# Patient Record
Sex: MALE | Race: AMERICAN INDIAN OR ALASKA NATIVE
[De-identification: names, ages, dates, MRNs, and addresses within clinical notes are randomized per-mention and may not be internally consistent; named-entity substitution may affect disease eponyms.]

---

## 2017-08-06 ENCOUNTER — HOSPITAL ENCOUNTER (EMERGENCY)
Dept: HOSPITAL 5 - ED | Age: 37
Discharge: HOME | End: 2017-08-06
Payer: SELF-PAY

## 2017-08-06 VITALS — DIASTOLIC BLOOD PRESSURE: 66 MMHG | SYSTOLIC BLOOD PRESSURE: 116 MMHG

## 2017-08-06 DIAGNOSIS — F17.200: ICD-10-CM

## 2017-08-06 DIAGNOSIS — M10.9: Primary | ICD-10-CM

## 2017-08-06 PROCEDURE — 73562 X-RAY EXAM OF KNEE 3: CPT

## 2017-08-06 PROCEDURE — 99283 EMERGENCY DEPT VISIT LOW MDM: CPT

## 2017-08-06 PROCEDURE — 96372 THER/PROPH/DIAG INJ SC/IM: CPT

## 2017-08-06 NOTE — XRAY REPORT
FINAL REPORT



PROCEDURE:  XR KNEE 3V RT



TECHNIQUE:  Three views of the right knee are obtained



HISTORY:  Right knee pain and swelling/ GOUT 



COMPARISON:  No prior studies are available for comparison.



FINDINGS:  

Mild soft tissue swelling and joint effusion are seen. No

erosions or soft tissue calcifications are seen. No fracture or

dislocation is seen.



IMPRESSION:  

Mild soft tissue swelling and joint effusion are seen.

## 2017-08-06 NOTE — EMERGENCY DEPARTMENT REPORT
Entered by DI GORDON, acting as scribe for JOHNNIE SILVER NP.





ED Lower Extremity HPI





- General


Chief Complaint: Extremity Injury, Lower


Stated Complaint: RT KNEE SWOLLEN GOUT


Time Seen by Provider: 17 17:01


Source: patient


Mode of arrival: Ambulatory


Limitations: No Limitations





- History of Present Illness


Initial Comments: 





This is a 36 y/o male, nontoxic, well nourished in appearance, no acute signs 

of distress presents with right knee pain x 2 days. Patient states he has a hx 

of gout.  Patient denies any trauma. Patient stated has similar symptoms in the 

past for gout and has been treated with steroids and pain medication.  Patients 

associated symptoms include swelling and pain but he denies numbness, tingling, 

fever, chills, joint redness, chest pain, shortness of breathe, nausea and 

vomiting. Pain is described as 8/10.  Denies injury. No alleviating factors 

despite taking OTC meds and no aggravating factors. FRANCES FINNEY Complaint: other (right knee pain)


Onset/Timin


-: days(s)


Injury: Knee: Right


Place: home


Severity: moderate


Severity scale (0 -10): 8


Improves With: nothing


Worsens With: nothing


Context: other (PMHx of gout)


Associated Symptoms: swelling, ambulatory.  denies: snap/pop sensation, numbness

, tingling, unable to bear weight, able to partially bear weight, other (fever, 

chills, nausea, vomiting)





- Related Data


 Previous Rx's











 Medication  Instructions  Recorded  Last Taken  Type


 


Ibuprofen [Motrin 600 MG tab] 600 mg PO Q8H PRN #30 tablet 17 Unknown Rx


 


predniSONE [Deltasone] 20 mg PO BID #10 tab 17 Unknown Rx











 Allergies











Allergy/AdvReac Type Severity Reaction Status Date / Time


 


No Known Allergies Allergy   Unverified 07/30/15 11:36














ED Review of Systems


Comment: All other systems reviewed and negative


Constitutional: denies: chills, fever


Eyes: denies: eye pain, eye discharge, vision change


ENT: denies: ear pain, throat pain


Respiratory: denies: cough, shortness of breath, wheezing


Cardiovascular: denies: chest pain, palpitations


Endocrine: no symptoms reported


Gastrointestinal: denies: nausea, vomiting


Genitourinary: denies: urgency, dysuria


Musculoskeletal: denies: back pain, joint swelling, arthralgia


Skin: other (swelling)


Neurological: denies: numbness, other (tingling)


Psychiatric: denies: anxiety, depression


Hematological/Lymphatic: denies: easy bleeding, easy bruising





ED Past Medical Hx





- Past Medical History


Previous Medical History?: Yes


Additional medical history: gout





- Surgical History


Past Surgical History?: No





- Social History


Smoking Status: Current Every Day Smoker


Substance Use Type: Alcohol, Non Opiate Pain





- Medications


Home Medications: 


 Home Medications











 Medication  Instructions  Recorded  Confirmed  Last Taken  Type


 


Ibuprofen [Motrin 600 MG tab] 600 mg PO Q8H PRN #30 tablet 17  Unknown Rx


 


predniSONE [Deltasone] 20 mg PO BID #10 tab 17  Unknown Rx














ED Physical Exam





- General


Limitations: No Limitations


General appearance: alert, in no apparent distress





- Head


Head exam: Present: atraumatic, normocephalic, normal inspection





- Eye


Eye exam: Present: normal appearance, PERRL, EOMI.  Absent: scleral icterus, 

conjunctival injection, nystagmus, periorbital swelling, periorbital tenderness


Pupils: Present: normal accommodation





- ENT


ENT exam: Present: normal exam, normal orophraynx, mucous membranes moist, TM's 

normal bilaterally, normal external ear exam





- Neck


Neck exam: Present: normal inspection, full ROM.  Absent: tenderness, 

meningismus, lymphadenopathy, thyromegaly





- Respiratory


Respiratory exam: Present: normal lung sounds bilaterally.  Absent: respiratory 

distress, wheezes, rales, rhonchi, stridor, chest wall tenderness, accessory 

muscle use, decreased breath sounds, prolonged expiratory





- Cardiovascular


Cardiovascular Exam: Present: regular rate, normal rhythm, normal heart sounds.

  Absent: bradycardia, tachycardia, irregular rhythm, systolic murmur, 

diastolic murmur, rubs, gallop





- GI/Abdominal


GI/Abdominal exam: Present: soft, normal bowel sounds.  Absent: distended, 

tenderness, guarding, rebound, rigid, diminished bowel sounds





- Rectal


Rectal exam: Present: deferred





- Extremities Exam


Extremities exam: Present: normal inspection, full ROM, normal capillary 

refill.  Absent: tenderness, pedal edema, joint swelling, calf tenderness





- Expanded Lower Extremity Exam


  ** Right


Hip exam: Present: normal inspection, full ROM.  Absent: tenderness


Upper Leg exam: Present: normal inspection, full ROM.  Absent: tenderness


Knee exam: Present: normal inspection (negative warm to touch.), full ROM, 

tenderness, swelling, effusion, full knee extension.  Absent: abrasion, 

laceration, ecchymosis, deformity, crepidus, dislocation, erythema, pain w/ 

pronation/supination, posterior draw sign, pain/laxity with valgus, pain/laxity 

with varus


Lower Leg exam: Present: normal inspection, full ROM.  Absent: tenderness, 

swelling, abrasion, laceration, ecchymosis, deformity, crepidus, dislocation, 

erythema, palpable cord, Marisel's sign


Ankle exam: Present: normal inspection, full ROM.  Absent: tenderness, swelling

, abrasion, laceration, ecchymosis, deformity, crepidus, dislocation, erythema, 

anterior draw sign


Foot/Toe exam: Present: normal inspection, full ROM.  Absent: tenderness, 

swelling, abrasion, laceration, ecchymosis, deformity, crepidus, dislocation, 

erythema, amputation, puncture wound, foreign body, calcaneal tenderness, 

tenderness at base of 5th metatarsal, nail avulsion, subungual hematoma


Neuro vascular tendon exam: Present: no vascular compromise.  Absent: pulse 

deficit, abnormal cap refill, motor deficit, sensory deficit, tendon deficit, 

extremity cold to touch, pallor, abnormal 2-point discrimination, decreased fine

/light touch, foot drop, peroneal nerve deficit, significant pain with passive 

ROM of distal joint


Gait: Positive: observed and normal





- Back Exam


Back exam: Present: normal inspection, full ROM.  Absent: tenderness, CVA 

tenderness (R), CVA tenderness (L), muscle spasm, paraspinal tenderness, 

vertebral tenderness, rash noted





- Neurological Exam


Neurological exam: Present: alert, oriented X3, CN II-XII intact, normal gait, 

reflexes normal





- Psychiatric


Psychiatric exam: Present: normal affect, normal mood





- Skin


Skin exam: Present: warm, dry, intact, normal color.  Absent: rash, erythema, 

other (cellulitis)





ED Course


 Vital Signs











  17





  15:46


 


Temperature 98.1 F


 


Pulse Rate 97 H


 


Respiratory 18





Rate 


 


Blood Pressure 116/66


 


O2 Sat by Pulse 95





Oximetry 














- Reevaluation(s)


Reevaluation #1: 





17 17:32


Patient is able to speak in full sentences with no signs of distress noted.





ED Lower Extremity MDM





- Medical Decision Making





Ed course: This is a 37-year-old male that presents with gout attack





1- patient was examined by myself. Xray has been obtained prior to my 

interview. Dictated by radiologist. Impression: Joint effusion with no 

fracture.  Patient has been notified of x-ray findings with no further was 

noted by the patient.


2- signs and symptoms are consistent with gout.  Patient received Toradol and 

prednisone IM and ED.


3- patient was instructed to follow-up with his primary care doctor in 3-5 days 

or if symptoms such as numbness, tingling, joint redness, warmth to touch, fever

, chills, nausea or vomiting return to emergency room as soon as possible.


4- patient received prednisone and ibuprofen at the time of discharge.


5- At time time of discharge, the patient does not seem toxic or ill in 

appearance.  No acute signs of distress noted.  Patient agrees to discharge 

treatment plan of care.  No further questions noted by the patient.





ED Disposition


Clinical Impression: 


Gout attack


Qualifiers:


 Gout site: unspecified site Gout etiology: unspecified cause Qualified Code(s)

: M10.9 - Gout, unspecified





Disposition: DC-01 TO HOME OR SELFCARE


Is pt being admited?: No


Does the pt Need Aspirin: No


Condition: Stable


Instructions:  Ibuprofen (By mouth), Prednisone (By mouth), Acute Gouty 

Arthritis (ED)


Additional Instructions: 


follow-up with the primary care doctor in 3-5 days or if symptoms such as 

numbness, tingling, joint redness, warmth to touch, fever, chills, nausea or 

vomiting return to emergency room as soon as possible.


Take prednisone and ibuprofen as prescribed.


Prescriptions: 


Ibuprofen [Motrin 600 MG tab] 600 mg PO Q8H PRN #30 tablet


 PRN Reason: Pain


predniSONE [Deltasone] 20 mg PO BID #10 tab


Referrals: 


PRIMARY CARE,MD [Primary Care Provider] - 3-5 Days


NELL CASTELLANOS MD [Staff Physician] - 3-5 Days


[facility] [Outside] - 3-5 Days


[facility] [Outside] - 3-5 Days





This documentation as recorded by the EVAN jeter ELIZABETH,accurately 

reflects the service I personally performed and the decisions made by me,JOHNNIE SILVER, NP.

## 2020-02-20 ENCOUNTER — HOSPITAL ENCOUNTER (EMERGENCY)
Dept: HOSPITAL 5 - ED | Age: 40
Discharge: HOME | End: 2020-02-20
Payer: SELF-PAY

## 2020-02-20 VITALS — DIASTOLIC BLOOD PRESSURE: 81 MMHG | SYSTOLIC BLOOD PRESSURE: 128 MMHG

## 2020-02-20 DIAGNOSIS — J40: ICD-10-CM

## 2020-02-20 DIAGNOSIS — J01.00: Primary | ICD-10-CM

## 2020-02-20 DIAGNOSIS — Z79.899: ICD-10-CM

## 2020-02-20 PROCEDURE — 99283 EMERGENCY DEPT VISIT LOW MDM: CPT

## 2020-02-20 PROCEDURE — 94640 AIRWAY INHALATION TREATMENT: CPT

## 2020-02-20 PROCEDURE — 93005 ELECTROCARDIOGRAM TRACING: CPT

## 2020-02-20 PROCEDURE — 71046 X-RAY EXAM CHEST 2 VIEWS: CPT

## 2020-02-20 PROCEDURE — 96374 THER/PROPH/DIAG INJ IV PUSH: CPT

## 2020-02-20 PROCEDURE — 94644 CONT INHLJ TX 1ST HOUR: CPT

## 2020-02-20 PROCEDURE — 93010 ELECTROCARDIOGRAM REPORT: CPT

## 2020-02-20 NOTE — EMERGENCY DEPARTMENT REPORT
ED General Adult HPI





- General


Chief complaint: Chest Pain


Stated complaint: CHEST PAIN/SOB


Time Seen by Provider: 20 01:27


Source: patient


Mode of arrival: Ambulatory


Limitations: No Limitations





- History of Present Illness


Initial comments: 


Mr. Nair is a 39-year-old -American male with a history of asthma 

recurrent bronchitis states recent incarceration.  He presents for cough with 

chest wall pain for the last 3 weeks.  States he was diagnosed with Pneumonia 4 

days ago when he was released from sn[facility] prior to receiving treatment.. He pr

esents tonight for cough chest wall pain active audible wheezing.  He denies 

history of intubation rates symptoms at 7/10 at this time however.  He does 

report cough that is productive yellow thick.  Chest pain is with cough only.  

Symptoms are exacerbated by environmental exposure.  Symptoms are relieved by 

nothing.





Onset/Timing: 3


-: week(s)


Location: chest


Radiation: non-radiation


Severity scale (0 -10): 5


Quality: sharp


Consistency: intermittent


Improves with: none


Worsens with: other (environmental Exposure )


Associated Symptoms: chest pain, cough, shortness of breath.  denies: 

fever/chills, nausea/vomiting


Treatments Prior to Arrival: none





- Related Data


                                  Previous Rx's











 Medication  Instructions  Recorded  Last Taken  Type


 


Ibuprofen [Motrin 600 MG tab] 600 mg PO Q8H PRN #30 tablet 17 Unknown Rx


 


predniSONE [Deltasone] 20 mg PO BID #10 tab 17 Unknown Rx


 


Albuterol INH(or & Nicu Only) 2 puff IH QID PRN #8.5 gram 20 Unknown Rx





[ProAir HFA Inhaler]    


 


Amoxicillin/Potassium Clav 1 each PO BID 10 Days #20 tablet 20 Unknown Rx





[Augmentin 875-125 Tablet]    


 


Ibuprofen [Motrin 800 MG tab] 800 mg PO Q8HR PRN #30 tablet 20 Unknown Rx


 


predniSONE [Deltasone] 40 mg PO QDAY 5 Days #10 tab 20 Unknown Rx











                                    Allergies











Allergy/AdvReac Type Severity Reaction Status Date / Time


 


No Known Allergies Allergy   Unverified 07/30/15 11:36














ED Review of Systems


ROS: 


Stated complaint: CHEST PAIN/SOB


Other details as noted in HPI





Constitutional: fever.  denies: chills


Eyes: denies: eye pain, eye discharge, vision change


ENT: ear pain, throat pain, congestion


Respiratory: cough, shortness of breath, wheezing


Cardiovascular: chest pain.  denies: palpitations


Endocrine: no symptoms reported


Gastrointestinal: denies: abdominal pain, nausea, vomiting, diarrhea


Genitourinary: denies: urgency, dysuria


Musculoskeletal: denies: back pain, joint swelling, arthralgia


Skin: denies: rash, lesions


Neurological: denies: headache, weakness, paresthesias, vertigo


Psychiatric: as per HPI


Hematological/Lymphatic: denies: easy bleeding, easy bruising





ED Past Medical Hx





- Past Medical History


Previous Medical History?: Yes


Additional medical history: gout





- Surgical History


Past Surgical History?: No





- Social History


Smoking Status: Never Smoker





- Medications


Home Medications: 


                                Home Medications











 Medication  Instructions  Recorded  Confirmed  Last Taken  Type


 


Ibuprofen [Motrin 600 MG tab] 600 mg PO Q8H PRN #30 tablet 17  Unknown Rx


 


predniSONE [Deltasone] 20 mg PO BID #10 tab 17  Unknown Rx


 


Albuterol INH(or & Nicu Only) 2 puff IH QID PRN #8.5 gram 20  Unknown Rx





[ProAir HFA Inhaler]     


 


Amoxicillin/Potassium Clav 1 each PO BID 10 Days #20 tablet 20  Unknown Rx





[Augmentin 875-125 Tablet]     


 


Ibuprofen [Motrin 800 MG tab] 800 mg PO Q8HR PRN #30 tablet 20  Unknown Rx


 


predniSONE [Deltasone] 40 mg PO QDAY 5 Days #10 tab 20  Unknown Rx














ED Physical Exam





- General


Limitations: No Limitations


General appearance: alert, in no apparent distress





- Head


Head exam: Present: atraumatic, normocephalic





- Eye


Eye exam: Present: normal appearance, PERRL, EOMI


Pupils: Present: normal accommodation





- ENT


ENT exam: Present: normal orophraynx, mucous membranes moist





- Expanded ENT Exam


  ** Expanded


Ear exam: Present: other (bilat maxillary sinus tenderness no swelling no 

erythema )


TM/Canal exam: Erythema: Left TM


Throat exam: Positive: tonsillar erythema, tonsillomegaly, other (uvula midline 

no edema, no exudate, no lesions no stridor ).  Negative: tonsillar exudate, R 

peritonsillar mass, L peritonsillar mass





- Neck


Neck exam: Present: normal inspection, full ROM.  Absent: tenderness, 

meningismus, lymphadenopathy, thyromegaly





- Respiratory


Respiratory exam: Present: wheezes, chest wall tenderness (right lateral chest 

wall tenderness , no crepitus, no swelling , no ecchymosis, ).  Absent: 

respiratory distress, rales, rhonchi, stridor





- Expanded Respiratory Exam


  ** Expanded


Location: Wheezes: Right, Left, Upper





- Cardiovascular


Cardiovascular Exam: Present: regular rate, normal rhythm, normal heart sounds. 

 Absent: systolic murmur, diastolic murmur, rubs, gallop





- GI/Abdominal


GI/Abdominal exam: Present: soft, normal bowel sounds.  Absent: tenderness, 

bruit, hernia





- Rectal


Rectal exam: Present: deferred





- Extremities Exam


Extremities exam: Present: normal inspection, full ROM





- Back Exam


Back exam: Present: normal inspection, full ROM.  Absent: tenderness, CVA 

tenderness (R), CVA tenderness (L)





- Neurological Exam


Neurological exam: Present: alert, oriented X3, CN II-XII intact, normal gait





- Psychiatric


Psychiatric exam: Present: normal affect, normal mood





- Skin


Skin exam: Present: warm, dry, intact, normal color.  Absent: rash





ED Course


                                   Vital Signs











  20





  00:26 02:36


 


Temperature 97.4 F L 


 


Pulse Rate 101 H 


 


Pulse Rate [  104 H





Bilateral]  


 


Respiratory 20 





Rate  


 


Respiratory  22





Rate [Bilateral  





]  


 


Blood Pressure 128/81 


 


O2 Sat by Pulse 98 





Oximetry  














ED Medical Decision Making





- EKG Data


EKG shows normal: sinus rhythm, axis, intervals, QRS complexes, ST-T waves


Rate: tachycardia (107 bpm )





- EKG Data


Interpretation: normal EKG (NSTach, no ST Elevated MI, EKG interp by ed 

attending )





- Radiology Data


Radiology results: report reviewed, image reviewed


 Findings


[facility] [address] [place] [address] 

XRay Report Signed Patient: YOLY NAIR MR#: [id number]  : 1980 

Acct:[id number] Age/Sex: 39 / M ADM Date: 20 Loc: ED Attending Dr: 

Ordering Physician: CHICHO LEONARD NP Date of Service: 20 Procedure

(s): XR chest routine 2V Accession Number(s): [id number] cc: CHICHO T. RELEFORD, NP

Fluoro Time In Minutes: CHEST 2 VIEWS INDICATION / CLINICAL INFORMATION: cp 

wheezing fever. COMPARISON: None available. FINDINGS: SUPPORT DEVICES: None. 

HEART / MEDIASTINUM: No significant abnormality. LUNGS / PLEURA: No significant 

pulmonary or pleural abnormality. .No pneumothorax. ADDITIONAL FINDINGS: No 

significant additional findings. IMPRESSION: 1. No acute findings. Signer Name: 

Wing Nevarez MD Signed: 2020 2:15 AM Workstation Name: [id number] 

Transcribed By: SS Dictated By: Wing Nevarez MD Electronically 

Authenticated By: Wing Nevarez MD Signed Date/Time: 20 DD/DT: 

20 TD/TT: 








- Medical Decision Making


Chest x-ray no infiltrate no opacities noted, symptoms are improved with 

medications given in ED.,  Given fever and 3-1/2-week course follow-up prescribe

augmentin, albuterol, prednisone, Tessalon, patient will take over-the-counter 

ibuprofen as needed for fever and chest wall pain.  Patient will follow-up with 

PCP in 2 to 3 days given referral to [facility].  Lung sounds 

are now much improved patient is ambulatory in ED without increased shortness of

breath or wheezing.


Critical care attestation.: 


If time is entered above; I have spent that time in minutes in the direct care 

of this critically ill patient, excluding procedure time.








ED Disposition


Clinical Impression: 


 Bronchitis





Sinusitis


Qualifiers:


 Sinusitis location: maxillary Chronicity: acute Recurrence: non-recurrent 

Qualified Code(s): J01.00 - Acute maxillary sinusitis, unspecified





Disposition:  TO HOME OR SELFCARE


Is pt being admited?: No


Does the pt Need Aspirin: No


Condition: Stable


Instructions:  Acute Bronchitis (ED), Sinusitis (ED), Asthma (ED)


Prescriptions: 


Amoxicillin/Potassium Clav [Augmentin 875-125 Tablet] 1 each PO BID 10 Days #20 

tablet


predniSONE [Deltasone] 40 mg PO QDAY 5 Days #10 tab


Ibuprofen [Motrin 800 MG tab] 800 mg PO Q8HR PRN #30 tablet


 PRN Reason: fever pain 


Albuterol INH(or & Nicu Only) [ProAir HFA Inhaler] 2 puff IH QID PRN #8.5 gram


 PRN Reason: Shortness Of Breath


Referrals: 


PRIMARY CARE,MD [Primary Care Provider] - 3-5 Days


[facility] [Outside] - 3-5 Days


Forms:  Work/School Release Form(ED)


Time of Disposition: 03:44

## 2020-03-07 ENCOUNTER — HOSPITAL ENCOUNTER (EMERGENCY)
Dept: HOSPITAL 5 - ED | Age: 40
Discharge: HOME | End: 2020-03-07
Payer: SELF-PAY

## 2020-03-07 VITALS — SYSTOLIC BLOOD PRESSURE: 126 MMHG | DIASTOLIC BLOOD PRESSURE: 85 MMHG

## 2020-03-07 DIAGNOSIS — J40: Primary | ICD-10-CM

## 2020-03-07 DIAGNOSIS — F17.200: ICD-10-CM

## 2020-03-07 DIAGNOSIS — B34.9: ICD-10-CM

## 2020-03-07 PROCEDURE — 99282 EMERGENCY DEPT VISIT SF MDM: CPT

## 2020-03-07 NOTE — EVENT NOTE
ED Screening Note


Date of service: 03/07/20


Time: 16:13


ED Screening Note: 





40 y.o. M. that presents to the ER with cough, SOB, and chest discomfort for 1 

month.





Treated for bronchitis when symptoms started.





Using inhaler with minimal improvement of symptoms.





SOB and chest discomfort with exertion.





This initial assessment/diagnostic orders/clinical plan/treatment(s) is/are 

subject to change based on patients health status, clinical progression and re-

assessment by fellow clinical providers in the ED. Further treatment and workup 

at subsequent clinical providers discretion. Patient/guardian urged not to elope

from the ED as their condition may be serious if not clinically assessed and 

managed. 





Initial orders include:

## 2020-04-01 ENCOUNTER — HOSPITAL ENCOUNTER (EMERGENCY)
Dept: HOSPITAL 5 - ED | Age: 40
LOS: 1 days | Discharge: HOME | End: 2020-04-02
Payer: SELF-PAY

## 2020-04-01 VITALS — DIASTOLIC BLOOD PRESSURE: 86 MMHG | SYSTOLIC BLOOD PRESSURE: 121 MMHG

## 2020-04-01 DIAGNOSIS — J06.9: Primary | ICD-10-CM

## 2020-04-01 DIAGNOSIS — J01.90: ICD-10-CM

## 2020-04-01 DIAGNOSIS — J20.8: ICD-10-CM

## 2020-04-01 DIAGNOSIS — Z79.899: ICD-10-CM

## 2020-04-01 DIAGNOSIS — M10.9: ICD-10-CM

## 2020-04-01 DIAGNOSIS — Z87.891: ICD-10-CM

## 2020-04-01 DIAGNOSIS — J45.909: ICD-10-CM

## 2020-04-01 PROCEDURE — 99283 EMERGENCY DEPT VISIT LOW MDM: CPT

## 2020-04-01 PROCEDURE — 96372 THER/PROPH/DIAG INJ SC/IM: CPT

## 2020-04-01 PROCEDURE — 71046 X-RAY EXAM CHEST 2 VIEWS: CPT

## 2020-04-01 PROCEDURE — 94640 AIRWAY INHALATION TREATMENT: CPT

## 2020-04-01 PROCEDURE — 94644 CONT INHLJ TX 1ST HOUR: CPT

## 2020-04-01 NOTE — XRAY REPORT
CHEST 2 VIEWS



INDICATION / CLINICAL INFORMATION:

Cough and shortness of breath.



COMPARISON: 

02/20/20.



FINDINGS:



SUPPORT DEVICES: None.

HEART / MEDIASTINUM: The heart size and pulmonary vasculature are normal. 

LUNGS / PLEURA: No significant pulmonary or pleural abnormality. No pneumothorax. 

ADDITIONAL FINDINGS: No significant additional findings.



IMPRESSION: No acute abnormality or significant change.



Signer Name: Raymond Bonilla MD 

Signed: 4/1/2020 11:04 PM

Workstation Name: [id number]

## 2020-04-02 NOTE — EMERGENCY DEPARTMENT REPORT
- General


Chief Complaint: Upper Respiratory Infection


Stated Complaint: SOB/CHEST PAIN


Source: patient


Mode of arrival: Ambulatory


Limitations: No Limitations





- History of Present Illness


Initial Comments: 





Patient is a 40-year-old -American male with a history of chronic 

bronchitis who presented to the ED with complaint of acute onset persistent 

nasal and sinus congestion, frontal sinus pressure and headache, persistent dry 

cough and wheezing with shortness of breath for the last 1 month, and which got 

worse in the last 1 week.  Patient states that he has been using his albuterol 

inhaler at home more frequently and he ran out of the same about 12 hours ago.  

Patient states that the symptoms have worsened especially in the last 24 hours. 

Patient denies dizziness, syncope, chest pain, abdominal pain, fever, chills, 

sore throat, nausea and vomiting or diarrhea.  Patient states that no one else 

at home is had similar symptoms.


MD Complaint: cough, rhinorrhea, nasal congestion, sinus pain


-: Gradual, month(s) (1)


Severity: severe


Severity scale (0 -10): 7


Quality: sharp, aching


Consistency: intermittent


Improves With: nothing


Worsens With: nothing


Associated Symptoms: denies other symptoms, rhinorrhea, nasal congestion, cough,

shortness of breath.  denies: fever, chills, myalgias, diaphoresis, headache, 

chest pain, abdominal pain, nausea, vomiting, diarrhea, confusion, weight loss, 

epistaxis


Treatments Prior to Arrival: none





- Related Data


                                  Previous Rx's











 Medication  Instructions  Recorded  Last Taken  Type


 


predniSONE [Deltasone] 20 mg PO BID #10 tab 08/06/17 Unknown Rx


 


Albuterol INH(or & Nicu Only) 2 puff IH QID PRN #8.5 gram 02/20/20 Unknown Rx





[ProAir HFA Inhaler]    


 


Amoxicillin/Potassium Clav 1 each PO BID 10 Days #20 tablet 02/20/20 Unknown Rx





[Augmentin 875-125 Tablet]    


 


Ibuprofen [Motrin 800 MG tab] 800 mg PO Q8HR PRN #30 tablet 02/20/20 Unknown Rx


 


predniSONE [Deltasone] 40 mg PO QDAY 5 Days #10 tab 02/20/20 Unknown Rx


 


Montelukast [Singulair] 10 mg PO QPM #30 tablet 03/07/20 Unknown Rx


 


methylPREDNISolone [Medrol 4MG 4 mg PO DAILY #1 tab.ds.pk 03/07/20 Unknown Rx





DOSEPAK (21 tabs)]    


 


Albuterol INH(or & Nicu Only) 2 puff IH QID PRN #8.5 gram 04/02/20 Unknown Rx





[ProAir HFA Inhaler]    


 


Benzonatate [Tessalon Perles] 100 mg PO Q8HR #30 capsule 04/02/20 Unknown Rx


 


Cetirizine HCl [Zyrtec 10mg tab] 10 mg PO DAILY #30 tablet 04/02/20 Unknown Rx


 


Doxycycline Hyclate 100 mg PO Q12H #20 tablet.dr 04/02/20 Unknown Rx


 


Ibuprofen [Motrin 600 MG tab] 600 mg PO Q8H PRN #30 tablet 04/02/20 Unknown Rx


 


Prednisone [predniSONE 10 mg 10 mg PO .TAPER #21 tab.ds.pk 04/02/20 Unknown Rx





(6-Day Pack, 21 Tabs)]    











                                    Allergies











Allergy/AdvReac Type Severity Reaction Status Date / Time


 


No Known Allergies Allergy   Verified 03/07/20 15:41














ED Review of Systems


ROS: 


Stated complaint: SOB/CHEST PAIN


Other details as noted in HPI





Constitutional: denies: chills, fever


Eyes: denies: eye pain, eye discharge, vision change


ENT: congestion.  denies: ear pain, throat pain


Respiratory: cough, shortness of breath, wheezing


Cardiovascular: denies: chest pain, palpitations


Endocrine: no symptoms reported


Gastrointestinal: denies: abdominal pain, nausea, diarrhea


Genitourinary: denies: urgency, dysuria


Musculoskeletal: denies: back pain, joint swelling, arthralgia


Skin: denies: rash, lesions


Neurological: denies: headache, weakness, paresthesias


Psychiatric: denies: anxiety, depression


Hematological/Lymphatic: denies: easy bleeding, easy bruising





ED Past Medical Hx





- Past Medical History


Previous Medical History?: Yes


Additional medical history: gout





- Surgical History


Past Surgical History?: No





- Social History


Smoking Status: Former Smoker


Substance Use Type: None





- Medications


Home Medications: 


                                Home Medications











 Medication  Instructions  Recorded  Confirmed  Last Taken  Type


 


predniSONE [Deltasone] 20 mg PO BID #10 tab 08/06/17  Unknown Rx


 


Albuterol INH(or & Nicu Only) 2 puff IH QID PRN #8.5 gram 02/20/20  Unknown Rx





[ProAir HFA Inhaler]     


 


Amoxicillin/Potassium Clav 1 each PO BID 10 Days #20 tablet 02/20/20  Unknown Rx





[Augmentin 875-125 Tablet]     


 


Ibuprofen [Motrin 800 MG tab] 800 mg PO Q8HR PRN #30 tablet 02/20/20  Unknown Rx


 


predniSONE [Deltasone] 40 mg PO QDAY 5 Days #10 tab 02/20/20  Unknown Rx


 


Montelukast [Singulair] 10 mg PO QPM #30 tablet 03/07/20  Unknown Rx


 


methylPREDNISolone [Medrol 4MG 4 mg PO DAILY #1 tab.ds.pk 03/07/20  Unknown Rx





DOSEPAK (21 tabs)]     


 


Albuterol INH(or & Nicu Only) 2 puff IH QID PRN #8.5 gram 04/02/20  Unknown Rx





[ProAir HFA Inhaler]     


 


Benzonatate [Tessalon Perles] 100 mg PO Q8HR #30 capsule 04/02/20  Unknown Rx


 


Cetirizine HCl [Zyrtec 10mg tab] 10 mg PO DAILY #30 tablet 04/02/20  Unknown Rx


 


Doxycycline Hyclate 100 mg PO Q12H #20 tablet.dr 04/02/20  Unknown Rx


 


Ibuprofen [Motrin 600 MG tab] 600 mg PO Q8H PRN #30 tablet 04/02/20  Unknown Rx


 


Prednisone [predniSONE 10 mg 10 mg PO .TAPER #21 tab.ds.pk 04/02/20  Unknown Rx





(6-Day Pack, 21 Tabs)]     














ED Physical Exam





- General


Limitations: No Limitations


General appearance: alert, in no apparent distress





- Head


Head exam: Present: atraumatic, normocephalic, normal inspection





- Eye


Eye exam: Present: normal appearance, PERRL, EOMI


Pupils: Present: normal accommodation





- ENT


ENT exam: Present: mucous membranes moist, TM's normal bilaterally, normal 

external ear exam, other (Grossly congested nasal passages)





- Neck


Neck exam: Present: normal inspection, full ROM.  Absent: tenderness, 

meningismus, lymphadenopathy, thyromegaly





- Respiratory


Respiratory exam: Present: wheezes (Moderately diffuse coarse wheezes 

throughout).  Absent: respiratory distress, rales, rhonchi, stridor, chest wall 

tenderness, accessory muscle use, decreased breath sounds





- Cardiovascular


Cardiovascular Exam: Present: normal rhythm, tachycardia, normal heart sounds.  

Absent: systolic murmur, diastolic murmur, rubs, gallop





- GI/Abdominal


GI/Abdominal exam: Present: soft, normal bowel sounds.  Absent: distended, 

tenderness, guarding, hyperactive bowel sounds, hypoactive bowel sounds, organo

megaly





- Extremities Exam


Extremities exam: Present: normal inspection, full ROM, normal capillary refill





- Back Exam


Back exam: Present: normal inspection, full ROM.  Absent: tenderness, CVA 

tenderness (R), CVA tenderness (L), muscle spasm, paraspinal tenderness, 

vertebral tenderness





- Neurological Exam


Neurological exam: Present: alert, oriented X3, CN II-XII intact, normal gait, 

reflexes normal





- Psychiatric


Psychiatric exam: Present: normal affect, normal mood





- Skin


Skin exam: Present: warm, dry, intact, normal color.  Absent: rash





ED Course





                                   Vital Signs











  04/01/20





  22:23


 


Temperature 98.1 F


 


Pulse Rate 105 H


 


Respiratory 18





Rate 


 


Blood Pressure 121/86


 


O2 Sat by Pulse 92





Oximetry 














ED Medical Decision Making





- Radiology Data


Radiology results: report reviewed, image reviewed





Chest x-ray shows no acute cardiopulmonary abnormalities.





- Medical Decision Making





This is a 40-year-old male with a history of chronic bronchitis who presented to

the ED with complaint of persistent shortness of breath, wheezing, dry cough, 

nasal and sinus congestion despite using his albuterol inhaler at home.  In the 

ED, patient is alert and oriented x3 and is not in distress but tachycardic and 

afebrile in triage.  Patient received DuoNeb treatment in the ED and also 

received steroid.  Chest x-ray shows no acute cardiopulmonary abnormalities or 

pneumonitis.  On reevaluation, patient's wheezing resolved with medications.  

Patient was discharged home on albuterol inhaler, oral steroids and cough 

medications.  Patient was advised to follow-up with his primary care physician 

in 3 to 5 days for reevaluation or return to the ED immediately if symptoms get 

worse.





- Differential Diagnosis


Pneumonia; Bronchitis; Sinusitis; URI; Reactive Airway disease


Critical care attestation.: 


If time is entered above; I have spent that time in minutes in the direct care 

of this critically ill patient, excluding procedure time.








ED Disposition


Clinical Impression: 


 Acute upper respiratory infection





Acute bronchitis


Qualifiers:


 Bronchitis organism: other organism Qualified Code(s): J20.8 - Acute bronchitis

due to other specified organisms





Acute sinusitis, unspecified


Qualifiers:


 Sinusitis location: pansinusitis Recurrence: non-recurrent Qualified Code(s): 

J01.40 - Acute pansinusitis, unspecified





Reactive airway disease with wheezing


Qualifiers:


 Asthma severity: moderate Asthma persistence: persistent Asthma complication 

type: with acute exacerbation Qualified Code(s): J45.41 - Moderate persistent 

asthma with (acute) exacerbation





Disposition: DC-01 TO HOME OR SELFCARE


Is pt being admited?: No


Does the pt Need Aspirin: No


Condition: Stable


Instructions:  Acute Bronchitis (ED), Acute Bacterial Rhinosinusitis (ED), R

eactive Airways Disease (ED)


Additional Instructions: 


Chest x-ray shows no acute cardiopulmonary abnormalities.  Take medications with

food, drink plenty of fluids and follow-up with your primary care physician in 5

to 7 days for reevaluation.  Return to the ED immediately if symptoms get worse.


Prescriptions: 


Doxycycline Hyclate 100 mg PO Q12H #20 tablet.


Ibuprofen [Motrin 600 MG tab] 600 mg PO Q8H PRN #30 tablet


 PRN Reason: Pain


Prednisone [predniSONE 10 mg (6-Day Pack, 21 Tabs)] 10 mg PO .TAPER #21 

tab.ds.pk


Albuterol INH(or & Nicu Only) [ProAir HFA Inhaler] 2 puff IH QID PRN #8.5 gram


 PRN Reason: Shortness Of Breath


Benzonatate [Tessalon Perles] 100 mg PO Q8HR #30 capsule


Cetirizine HCl [Zyrtec 10mg tab] 10 mg PO DAILY #30 tablet


Referrals: 


BREE LARES MD [Staff Physician] - 3-5 Days


Forms:  Work/School Release Form(ED)


Time of Disposition: 04:19


Print Language: ENGLISH

## 2020-04-17 ENCOUNTER — HOSPITAL ENCOUNTER (INPATIENT)
Dept: HOSPITAL 5 - ED | Age: 40
LOS: 1 days | Discharge: HOME | DRG: 440 | End: 2020-04-18
Attending: INTERNAL MEDICINE | Admitting: INTERNAL MEDICINE
Payer: COMMERCIAL

## 2020-04-17 DIAGNOSIS — K85.20: Primary | ICD-10-CM

## 2020-04-17 DIAGNOSIS — M10.9: ICD-10-CM

## 2020-04-17 DIAGNOSIS — Z79.899: ICD-10-CM

## 2020-04-17 DIAGNOSIS — F10.20: ICD-10-CM

## 2020-04-17 DIAGNOSIS — J45.909: ICD-10-CM

## 2020-04-17 LAB
ALBUMIN SERPL-MCNC: 2.8 G/DL (ref 3.9–5)
ALT SERPL-CCNC: 13 UNITS/L (ref 7–56)
BASOPHILS # (AUTO): 0.1 K/MM3 (ref 0–0.1)
BASOPHILS NFR BLD AUTO: 0.4 % (ref 0–1.8)
BILIRUB UR QL STRIP: (no result)
BLOOD UR QL VISUAL: (no result)
BUN SERPL-MCNC: 4 MG/DL (ref 9–20)
BUN/CREAT SERPL: 5 %
CALCIUM SERPL-MCNC: 9.3 MG/DL (ref 8.4–10.2)
EOSINOPHIL # BLD AUTO: 0.5 K/MM3 (ref 0–0.4)
EOSINOPHIL NFR BLD AUTO: 3.1 % (ref 0–4.3)
HCT VFR BLD CALC: 36.9 % (ref 35.5–45.6)
HEMOLYSIS INDEX: 4
HGB BLD-MCNC: 11.8 GM/DL (ref 11.8–15.2)
LYMPHOCYTES # BLD AUTO: 1 K/MM3 (ref 1.2–5.4)
LYMPHOCYTES NFR BLD AUTO: 6.9 % (ref 13.4–35)
MCHC RBC AUTO-ENTMCNC: 32 % (ref 32–34)
MCV RBC AUTO: 63 FL (ref 84–94)
MONOCYTES # (AUTO): 0.7 K/MM3 (ref 0–0.8)
MONOCYTES % (AUTO): 4.9 % (ref 0–7.3)
MUCOUS THREADS #/AREA URNS HPF: (no result) /HPF
PH UR STRIP: 8 [PH] (ref 5–7)
PLATELET # BLD: 104 K/MM3 (ref 140–440)
PROT UR STRIP-MCNC: >500 MG/DL
RBC # BLD AUTO: 5.81 M/MM3 (ref 3.65–5.03)
RBC #/AREA URNS HPF: 1 /HPF (ref 0–6)
UROBILINOGEN UR-MCNC: < 2 MG/DL (ref ?–2)
WBC #/AREA URNS HPF: 1 /HPF (ref 0–6)

## 2020-04-17 PROCEDURE — 74177 CT ABD & PELVIS W/CONTRAST: CPT

## 2020-04-17 PROCEDURE — 83690 ASSAY OF LIPASE: CPT

## 2020-04-17 PROCEDURE — 82150 ASSAY OF AMYLASE: CPT

## 2020-04-17 PROCEDURE — 85025 COMPLETE CBC W/AUTO DIFF WBC: CPT

## 2020-04-17 PROCEDURE — 74022 RADEX COMPL AQT ABD SERIES: CPT

## 2020-04-17 PROCEDURE — 36415 COLL VENOUS BLD VENIPUNCTURE: CPT

## 2020-04-17 PROCEDURE — 83036 HEMOGLOBIN GLYCOSYLATED A1C: CPT

## 2020-04-17 PROCEDURE — 81001 URINALYSIS AUTO W/SCOPE: CPT

## 2020-04-17 PROCEDURE — 80053 COMPREHEN METABOLIC PANEL: CPT

## 2020-04-17 NOTE — XRAY REPORT
ACUTE ABDOMINAL SERIES



INDICATION / CLINICAL INFORMATION:

Abdominal pain and constipation. Heartburn.



COMPARISON:

None available.



FINDINGS:

Upright and supine views of the abdomen demonstrate scattered air-fluid levels, predominantly in the 
colon. There is no evidence of bowel dilatation, free air or mass effect. No abnormal calcification i
s seen.



The accompanying chest radiograph constraints a normal heart size and clear lungs.



IMPRESSION: Scattered air-fluid levels in the colon without associated dilatation. The findings may b
e related to a low-grade colitis. Other causes of diarrhea could have this appearance.



Signer Name: Raymond Bonilla MD 

Signed: 4/17/2020 10:41 PM

Workstation Name: [id number]

## 2020-04-18 VITALS — DIASTOLIC BLOOD PRESSURE: 80 MMHG | SYSTOLIC BLOOD PRESSURE: 126 MMHG

## 2020-04-18 RX ADMIN — FAMOTIDINE SCH MG: 10 INJECTION, SOLUTION INTRAVENOUS at 03:57

## 2020-04-18 RX ADMIN — HEPARIN SODIUM SCH UNIT: 5000 INJECTION, SOLUTION INTRAVENOUS; SUBCUTANEOUS at 10:20

## 2020-04-18 RX ADMIN — HEPARIN SODIUM SCH: 5000 INJECTION, SOLUTION INTRAVENOUS; SUBCUTANEOUS at 10:22

## 2020-04-18 RX ADMIN — FAMOTIDINE SCH MG: 10 INJECTION, SOLUTION INTRAVENOUS at 10:20

## 2020-04-18 NOTE — CAT SCAN REPORT
CT abdomen pelvis w con 



INDICATION / CLINICAL INFORMATION:

MAIN: eupper ABD Pain   100cc Omni 300 .



TECHNIQUE:

Axial CT imaging of abdomen and pelvis was obtained with IV contrast. Coronal and sagittal reformatte
d imaging obtained and reviewed.  All CT scans at this location are performed using CT dose reduction
 for ALARA by means of automated exposure control. 



COMPARISON:

None available.



FINDINGS:

CT abdomen with contrast demonstrates normal appearance of the liver, spleen, kidneys and adrenal gla
nds. Gallbladder is mostly collapsed. No biliary dilatation.



The pancreas is diffusely enlarged and there is mild peripancreatic inflammatory change surrounding e
ntire pancreas consistent with pancreatitis. Pancreatic parenchyma is enhancing normally. No biliary 
dilatation. No free fluid or focal abnormal fluid collection.



CT pelvis with contrast demonstrates normal appearance of a retrocecal appendix. No pelvic mass, free
 fluid, or focal inflammatory change. GI tract is grossly unremarkable.



Visualized lung bases are clear.



No acute osseous abnormality.



IMPRESSION:

1. Mild acute pancreatitis. 



Signer Name: Wilma Palacios MD 

Signed: 4/18/2020 12:02 AM

Workstation Name: [email or web address]

## 2020-04-18 NOTE — EMERGENCY DEPARTMENT REPORT
ED Abdominal Pain HPI





- General


Chief Complaint: Abdominal Pain


Stated Complaint: STOMAHC PAINS HEARTBURN SLIGHT SOB


Time Seen by Provider: 20 21:50


Source: patient


Mode of arrival: Ambulatory


Limitations: No Limitations





- History of Present Illness


Initial Comments: 





30-year-old male with no comorbidities was emergency department complaining of 

upper abdominal pain associated with some nausea and burning.  Reports having 

some EtOH preceding his pain no.no diarrhea no constipation no hematemesis no 

hematochezia.


MD Complaint: abdominal pain


-: Gradual (1), days(s)





- Related Data


                                  Previous Rx's











 Medication  Instructions  Recorded  Last Taken  Type


 


predniSONE [Deltasone] 20 mg PO BID #10 tab 17 Unknown Rx


 


Albuterol INH(or & Nicu Only) 2 puff IH QID PRN #8.5 gram 20 Unknown Rx





[ProAir HFA Inhaler]    


 


Amoxicillin/Potassium Clav 1 each PO BID 10 Days #20 tablet 20 Unknown Rx





[Augmentin 875-125 Tablet]    


 


Ibuprofen [Motrin 800 MG tab] 800 mg PO Q8HR PRN #30 tablet 20 Unknown Rx


 


predniSONE [Deltasone] 40 mg PO QDAY 5 Days #10 tab 20 Unknown Rx


 


Montelukast [Singulair] 10 mg PO QPM #30 tablet 20 Unknown Rx


 


methylPREDNISolone [Medrol 4MG 4 mg PO DAILY #1 tab.ds.pk 20 Unknown Rx





DOSEPAK (21 tabs)]    


 


Albuterol INH(or & Nicu Only) 2 puff IH QID PRN #8.5 gram 20 Unknown Rx





[ProAir HFA Inhaler]    


 


Benzonatate [Tessalon Perles] 100 mg PO Q8HR #30 capsule 20 Unknown Rx


 


Cetirizine HCl [Zyrtec 10mg tab] 10 mg PO DAILY #30 tablet 20 Unknown Rx


 


Doxycycline Hyclate 100 mg PO Q12H #20 tablet. 20 Unknown Rx


 


Ibuprofen [Motrin 600 MG tab] 600 mg PO Q8H PRN #30 tablet 20 Unknown Rx


 


Prednisone [predniSONE 10 mg 10 mg PO .TAPER #21 tab.ds.pk 20 Unknown Rx





(6-Day Pack, 21 Tabs)]    











                                    Allergies











Allergy/AdvReac Type Severity Reaction Status Date / Time


 


No Known Allergies Allergy   Verified 20 15:41














ED Review of Systems


ROS: 


Stated complaint: STOMAHC PAINS HEARTBURN SLIGHT SOB


Other details as noted in HPI





Comment: All other systems reviewed and negative





ED Past Medical Hx





- Past Medical History


Previous Medical History?: Yes


Additional medical history: gout, Bronchitis





- Surgical History


Past Surgical History?: No





- Social History


Smoking Status: Former Smoker


Substance Use Type: None





- Medications


Home Medications: 


                                Home Medications











 Medication  Instructions  Recorded  Confirmed  Last Taken  Type


 


predniSONE [Deltasone] 20 mg PO BID #10 tab 17  Unknown Rx


 


Albuterol INH(or & Nicu Only) 2 puff IH QID PRN #8.5 gram 20  Unknown Rx





[ProAir HFA Inhaler]     


 


Amoxicillin/Potassium Clav 1 each PO BID 10 Days #20 tablet 20  Unknown Rx





[Augmentin 875-125 Tablet]     


 


Ibuprofen [Motrin 800 MG tab] 800 mg PO Q8HR PRN #30 tablet 20  Unknown Rx


 


predniSONE [Deltasone] 40 mg PO QDAY 5 Days #10 tab 20  Unknown Rx


 


Montelukast [Singulair] 10 mg PO QPM #30 tablet 20  Unknown Rx


 


methylPREDNISolone [Medrol 4MG 4 mg PO DAILY #1 tab.ds.pk 20  Unknown Rx





DOSEPAK (21 tabs)]     


 


Albuterol INH(or & Nicu Only) 2 puff IH QID PRN #8.5 gram 20  Unknown Rx





[ProAir HFA Inhaler]     


 


Benzonatate [Tessalon Perles] 100 mg PO Q8HR #30 capsule 20  Unknown Rx


 


Cetirizine HCl [Zyrtec 10mg tab] 10 mg PO DAILY #30 tablet 20  Unknown Rx


 


Doxycycline Hyclate 100 mg PO Q12H #20 tablet.dr 20  Unknown Rx


 


Ibuprofen [Motrin 600 MG tab] 600 mg PO Q8H PRN #30 tablet 20  Unknown Rx


 


Prednisone [predniSONE 10 mg 10 mg PO .TAPER #21 tab.ds.pk 20  Unknown Rx





(6-Day Pack, 21 Tabs)]     














ED Physical Exam





- General


Limitations: No Limitations


General appearance: alert, in no apparent distress





- Head


Head exam: Present: atraumatic, normocephalic





- Eye


Eye exam: Present: normal appearance, PERRL, EOMI


Pupils: Present: normal accommodation





- ENT


ENT exam: Present: normal exam, normal orophraynx, mucous membranes moist, TM's 

normal bilaterally





- Neck


Neck exam: Present: normal inspection





- Respiratory


Respiratory exam: Present: normal lung sounds bilaterally.  Absent: respiratory 

distress, wheezes, rales, chest wall tenderness, accessory muscle use





- Cardiovascular


Cardiovascular Exam: Present: regular rate, normal rhythm.  Absent: systolic 

murmur, diastolic murmur, rubs, gallop





- GI/Abdominal


GI/Abdominal exam: Present: soft, tenderness (To the epigastric region), normal 

bowel sounds.  Absent: rebound, hypoactive bowel sounds, mass, bruit, pulsatile 

mass





- Rectal


Rectal exam: Present: deferred





- Extremities Exam


Extremities exam: Present: normal inspection





- Back Exam


Back exam: Present: normal inspection





- Neurological Exam


Neurological exam: Present: alert, oriented X3





- Psychiatric


Psychiatric exam: Present: normal affect, normal mood





- Skin


Skin exam: Present: warm, dry, intact, normal color.  Absent: rash





ED Course





                                   Vital Signs











  20





  21:17


 


Temperature 98.6 F


 


Pulse Rate 110 H


 


Respiratory 18





Rate 


 


Blood Pressure 145/97


 


O2 Sat by Pulse 97





Oximetry 














ED Medical Decision Making





- Lab Data


Result diagrams: 


                                 20 21:37





                                 20 21:37





- Radiology Data


Radiology results: report reviewed





[place] [address] 





Cat Scan Report 


Signed 





 Patient: YOLY NAIR MR#: [id number] 


  


 : 1980 Acct:[id number] 





 Age/Sex: 40 / M ADM Date: 20 





 Loc: ED 


 Attending Dr: 








 Ordering Physician: BEAU CEVALLOS 


 Date of Service: 20 


 Procedure(s): CT abdomen pelvis w con 


 Accession Number(s): [id number] 





 cc: BEAU CEVALLOS 








 CT abdomen pelvis w con 





INDICATION / CLINICAL INFORMATION: 


MAIN: eupper ABD Pain 100cc Omni 300 . 





TECHNIQUE: 


Axial CT imaging of abdomen and pelvis was obtained with IV contrast. Coronal 

and sagittal 


 reformatted imaging obtained and reviewed. All CT scans at this location are 

performed using CT 


 dose reduction for ALARA by means of automated exposure control. 





COMPARISON: 


None available. 





FINDINGS: 


CT abdomen with contrast demonstrates normal appearance of the liver, spleen, 

kidneys and adrenal 


 glands. Gallbladder is mostly collapsed. No biliary dilatation. 





The pancreas is diffusely enlarged and there is mild peripancreatic inflammatory

change surrounding


 entire pancreas consistent with pancreatitis. Pancreatic parenchyma is 

enhancing normally. No 


 biliary dilatation. No free fluid or focal abnormal fluid collection. 





CT pelvis with contrast demonstrates normal appearance of a retrocecal appendix.

No pelvic mass, 


 free fluid, or focal inflammatory change. GI tract is grossly unremarkable. 





Visualized lung bases are clear. 





No acute osseous abnormality. 





IMPRESSION: 


1. Mild acute pancreatitis. 





Signer Name: Wilma Palacios MD 


Signed: 2020 12:02 AM 


Workstation Name: [id number] 








 Transcribed By:  


 Dictated By: Wilma Palacios MD 


 Electronically Authenticated By: Wilma Palacios MD 


 Signed Date/Time: 20 0002 











 DD/DT: 20 2359 


 TD/TT: 





- Medical Decision Making





[address] [address] 





Cat Scan Report 


Signed 





 Patient: YOLY NAIR MR#: [id number] 


  


 : 1980 Acct:[id number] 





 Age/Sex: 40 / M ADM Date: 20 





 Loc: ED 


 Attending Dr: 








 Ordering Physician: BEAU CEVALLOS 


 Date of Service: 20 


 Procedure(s): CT abdomen pelvis w con 


 Accession Number(s): [id number] 





 cc: BEAU CEVALLOS 








 CT abdomen pelvis w con 





INDICATION / CLINICAL INFORMATION: 


MAIN: eupper ABD Pain 100cc Omni 300 . 





TECHNIQUE: 


Axial CT imaging of abdomen and pelvis was obtained with IV contrast. Coronal 

and sagittal 


 reformatted imaging obtained and reviewed. All CT scans at this location are 

performed using CT 


 dose reduction for ALARA by means of automated exposure control. 





COMPARISON: 


None available. 





FINDINGS: 


CT abdomen with contrast demonstrates normal appearance of the liver, spleen, 

kidneys and adrenal 


 glands. Gallbladder is mostly collapsed. No biliary dilatation. 





The pancreas is diffusely enlarged and there is mild peripancreatic inflammatory

change surrounding


 entire pancreas consistent with pancreatitis. Pancreatic parenchyma is 

enhancing normally. No 


 biliary dilatation. No free fluid or focal abnormal fluid collection. 





CT pelvis with contrast demonstrates normal appearance of a retrocecal appendix.

No pelvic mass, 


 free fluid, or focal inflammatory change. GI tract is grossly unremarkable. 





Visualized lung bases are clear. 





No acute osseous abnormality. 





IMPRESSION: 


1. Mild acute pancreatitis. 





Signer Name: Wilma Palacios MD 


Signed: 2020 12:02 AM 


Workstation Name: [id number] 








 Transcribed By: JR 


 Dictated By: Wilma Palacios MD 


 Electronically Authenticated By: Wilma Palacios MD 


 Signed Date/Time: 20 0002 











 DD/DT: 20 2359 


 TD/TT: 





This patient presents with abdominal pain which appears to be secondary to acute

pancreatitis. A CT scan was performed to evaluate for potential causes of the 

abdominal pain, and did yield a mild pancreatitis etiology for the abdominal 

pain. Specifically, given the benign exam, the laboratory studies, and 

unremarkable CT, I have a very low suspicion for appendicitis, ischemic bowel, 

bowel perforation, or any other life threatening disease. I have discussed with 

the patient the findings on CT examination for the cause of abdominal pain and 

clearly explained the need t for admission for definitive treatment of this 

condition.  He was provided with fluids and placed on n.p.o. Case was discussed 

with the attending Dr. Maldonado as well as the hospitalist Dr. Berrios


Critical care attestation.: 


If time is entered above; I have spent that time in minutes in the direct care 

of this critically ill patient, excluding procedure time.








ED Disposition


Clinical Impression: 


 Acute pancreatitis





Disposition:  OP ADMIT IP TO THIS HOSP


Is pt being admited?: Yes


Does the pt Need Aspirin: No


Condition: Stable


Referrals: 


[facility], MD [Primary Care Provider] - 3-5 Days

## 2020-04-18 NOTE — DISCHARGE SUMMARY
Providers





- Providers


Date of Admission: 


04/18/20 04:35





Date of discharge: 04/18/20


Attending physician: 


OSBALDO MARES





Primary care physician: 


[facility], [place]








Hospitalization


Reason for admission: Acute pancreatitis


Condition: Stable


Hospital course: 





30-year-old male with history of asthma and bronchitis comes in for severe epig

astric pain for the last 3 days.  Associated with vomiting.  Pain is about 10 on

a scale of 1-10.  Patient has been using alcohol on a regular basis.  Had a pint

of vodka on Monday which is April 14, 2020.  Vomited couple of times.  No blood 

in the vomit.  No diarrhea.  There is the first episode of epigastric pain..  

Patient has a history of asthma and bronchitis and regular basis.  No cough now.

 No exposure to coronavirus patients.  No recent travel.  The patient was 

admitted with diagnosis of acute alcohol pancreatitis.  The patient was noted to

have lipase elevated at 982.  Patient received supportive care with antiemetics 

and IV fluid hydration.  Patient had improvement of symptoms and was able to 

tolerate a diet.  Lipase improved to 510.  Patient is felt to have received 

maximal hospital benefit and will be discharged home.  Dedicated discharge time 

35 minutes


Disposition: DC-01 TO HOME OR SELFCARE


Time spent for discharge: 35





- Discharge Diagnoses


(1) Acute pancreatitis


Status: Acute   


Qualifiers: 


   Pancreatitis type: alcohol induced   Acute pancreatitis complication: 

unspecified   Qualified Code(s): K85.20 - Alcohol induced acute pancreatitis 

without necrosis or infection   





(2) EtOH dependence


Status: Chronic   


Qualifiers: 


   Substance use status: uncomplicated   Qualified Code(s): F10.20 - Alcohol 

dependence, uncomplicated   





Core Measure Documentation





- Palliative Care


Palliative Care/ Comfort Measures: Not Applicable





- Core Measures


Any of the following diagnoses?: none





Exam





- Constitutional


Vitals: 


                                        











Temp Pulse Resp BP Pulse Ox


 


 98.3 F   74   18   135/86   92 


 


 04/18/20 07:22  04/18/20 07:22  04/18/20 07:22  04/18/20 07:22  04/18/20 07:22











General appearance: Present: no acute distress, well-nourished





- EENT


Eyes: Present: PERRL


ENT: hearing intact, clear oral mucosa





- Neck


Neck: Present: supple, normal ROM





- Respiratory


Respiratory effort: normal


Respiratory: bilateral: CTA





- Cardiovascular


Heart Sounds: Present: S1 & S2.  Absent: rub, click





- Extremities


Extremities: pulses symmetrical, No edema


Peripheral Pulses: within normal limits





- Abdominal


General gastrointestinal: Present: soft, non-tender, non-distended, normal bowel

sounds


Male genitourinary: Present: normal





- Integumentary


Integumentary: Present: clear, warm, dry





- Musculoskeletal


Musculoskeletal: gait normal, strength equal bilaterally





- Psychiatric


Psychiatric: appropriate mood/affect, intact judgment & insight





- Neurologic


Neurologic: CNII-XII intact, moves all extremities





Plan


Activity: advance as tolerated


Weight Bearing Status: Weight Bear as Tolerated


Diet: regular


Follow up with: 


[facility], [place] [Primary Care Provider] - 3-5 Days


Prescriptions: 


oxyCODONE /ACETAMINOPHEN [Percocet 5/325] 1 tab PO Q4HR #12 tab


Albuterol INH(or & Nicu Only) [ProAir HFA Inhaler] 2 puff IH QID PRN #8.5 gram


 PRN Reason: Shortness Of Breath


Montelukast [Singulair] 10 mg PO QPM #30 tablet

## 2020-04-18 NOTE — HISTORY AND PHYSICAL REPORT
History of Present Illness


Date of examination: 04/18/20


Date of admission: 


April 18, 2020


Chief complaint: 





Severe epigastric pain and vomiting for last 3 days


History of present illness: 


30-year-old male with history of asthma and bronchitis comes in for severe epiga

stric pain for the last 3 days.  Associated with vomiting.  Pain is about 10 on 

a scale of 1-10.  Patient has been using alcohol on a regular basis.  Had a pint

of vodka on Monday which is April 14, 2020.  Vomited couple of times.  No blood 

in the vomit.  No diarrhea.  There is the first episode of epigastric pain..  

Patient has a history of asthma and bronchitis and regular basis.  No cough now.

 No exposure to coronavirus patients.  No recent travel.














- Past Medical History


Previous Medical History?: Yes


Additional medical history: gout, Bronchitis





- Surgical History


Past Surgical History?: No





- Social History


Smoking Status: Former Smoker


Substance Use Type: None





- Medications


Home Medications: 


                                Home Medications











 Medication  Instructions  Recorded  Confirmed  Last Taken  Type


 


predniSONE [Deltasone] 20 mg PO BID #10 tab 08/06/17  Unknown Rx


 


Albuterol INH(or & Nicu Only) 2 puff IH QID PRN #8.5 gram 02/20/20  Unknown Rx





[ProAir HFA Inhaler]     


 


Amoxicillin/Potassium Clav 1 each PO BID 10 Days #20 tablet 02/20/20  Unknown Rx





[Augmentin 875-125 Tablet]     


 


Ibuprofen [Motrin 800 MG tab] 800 mg PO Q8HR PRN #30 tablet 02/20/20  Unknown Rx


 


predniSONE [Deltasone] 40 mg PO QDAY 5 Days #10 tab 02/20/20  Unknown Rx


 


Montelukast [Singulair] 10 mg PO QPM #30 tablet 03/07/20  Unknown Rx


 


methylPREDNISolone [Medrol 4MG 4 mg PO DAILY #1 tab.ds.pk 03/07/20  Unknown Rx





DOSEPAK (21 tabs)]     


 


Albuterol INH(or & Nicu Only) 2 puff IH QID PRN #8.5 gram 04/02/20  Unknown Rx





[ProAir HFA Inhaler]     


 


Benzonatate [Tessalon Perles] 100 mg PO Q8HR #30 capsule 04/02/20  Unknown Rx


 


Cetirizine HCl [Zyrtec 10mg tab] 10 mg PO DAILY #30 tablet 04/02/20  Unknown Rx


 


Doxycycline Hyclate 100 mg PO Q12H #20 tablet. 04/02/20  Unknown Rx


 


Ibuprofen [Motrin 600 MG tab] 600 mg PO Q8H PRN #30 tablet 04/02/20  Unknown Rx


 


Prednisone [predniSONE 10 mg 10 mg PO .TAPER #21 tab.ds.pk 04/02/20  Unknown Rx





(6-Day Pack, 21 Tabs)]     

















Review of Systems


ROS: 


GI severe epigastric pain associated with nausea and vomiting.  Pain is about 10

on a scale of 1-10.





Constitutional no weight loss or weight gain no fever or chills


HEENT no sore throat no post nasal drip no diplopia


Neck no neck stiffness no lymph gland enlargement


Chest and lungs no shortness of breath cough or wheezing


CVS no chest pain no diaphoresis no palpitations





Genitourinary system no dysuria no flank pain


Musculoskeletal system no muscle pains no joint pains


CNS no syncope no seizures


Skin no rash no itching


Psychiatric no depression no homicidal or suicidal tendencies


Hematologic no lymphedema or bruising


Endocrine no polydipsia no polyuria no cold intolerance no heat intolerance
































Medications and Allergies


                                    Allergies











Allergy/AdvReac Type Severity Reaction Status Date / Time


 


No Known Allergies Allergy   Verified 03/07/20 15:41











                                Home Medications











 Medication  Instructions  Recorded  Confirmed  Last Taken  Type


 


predniSONE [Deltasone] 20 mg PO BID #10 tab 08/06/17  Unknown Rx


 


Albuterol INH(or & Nicu Only) 2 puff IH QID PRN #8.5 gram 02/20/20  Unknown Rx





[ProAir HFA Inhaler]     


 


Amoxicillin/Potassium Clav 1 each PO BID 10 Days #20 tablet 02/20/20  Unknown Rx





[Augmentin 875-125 Tablet]     


 


Ibuprofen [Motrin 800 MG tab] 800 mg PO Q8HR PRN #30 tablet 02/20/20  Unknown Rx


 


predniSONE [Deltasone] 40 mg PO QDAY 5 Days #10 tab 02/20/20  Unknown Rx


 


Montelukast [Singulair] 10 mg PO QPM #30 tablet 03/07/20  Unknown Rx


 


methylPREDNISolone [Medrol 4MG 4 mg PO DAILY #1 tab.ds.pk 03/07/20  Unknown Rx





DOSEPAK (21 tabs)]     


 


Albuterol INH(or & Nicu Only) 2 puff IH QID PRN #8.5 gram 04/02/20  Unknown Rx





[ProAir HFA Inhaler]     


 


Benzonatate [Tessalon Perles] 100 mg PO Q8HR #30 capsule 04/02/20  Unknown Rx


 


Cetirizine HCl [Zyrtec 10mg tab] 10 mg PO DAILY #30 tablet 04/02/20  Unknown Rx


 


Doxycycline Hyclate 100 mg PO Q12H #20 tablet.dr 04/02/20  Unknown Rx


 


Ibuprofen [Motrin 600 MG tab] 600 mg PO Q8H PRN #30 tablet 04/02/20  Unknown Rx


 


Prednisone [predniSONE 10 mg 10 mg PO .TAPER #21 tab.ds.pk 04/02/20  Unknown Rx





(6-Day Pack, 21 Tabs)]     














Exam





- Constitutional


Vitals: 


                                        











Temp Pulse Resp BP Pulse Ox


 


 98.1 F   94 H  18   147/93   99 


 


 04/18/20 01:29  04/18/20 01:29  04/18/20 01:29  04/18/20 01:29  04/18/20 01:29











General appearance: Present: no acute distress, well-nourished





- EENT


Eyes: Present: PERRL


ENT: hearing intact, clear oral mucosa





- Neck


Neck: Present: supple, normal ROM





- Respiratory


Respiratory effort: normal


Respiratory: bilateral: CTA





- Cardiovascular


Heart rate: 78


Rhythm: regular


Heart Sounds: Present: S1 & S2.  Absent: rub, click





- Extremities


Extremities: no ischemia, pulses intact, pulses symmetrical, No edema


Peripheral Pulses: within normal limits





- Abdominal


General gastrointestinal: Present: soft, tender, non-distended, normal bowel 

sounds


Localized gastrointestinal: tender: diffuse, epigastric periumbilical, guarding:

epigastric periumbilical


Male genitourinary: Present: normal





- Rectal


Rectal Exam: stool brown





- Integumentary


Integumentary: Present: clear, warm, dry





- Musculoskeletal


Musculoskeletal: gait normal, strength equal bilaterally





- Psychiatric


Psychiatric: appropriate mood/affect, intact judgment & insight





- Neurologic


Neurologic: CNII-XII intact, moves all extremities





- Allied Health


Allied health notes reviewed: nursing, case management





Results





- Labs


CBC & Chem 7: 


                                 04/17/20 21:37





                                 04/17/20 21:37


Labs: 


                             Laboratory Last Values











WBC  15.1 K/mm3 (4.5-11.0)  H  04/17/20  21:37    


 


RBC  5.81 M/mm3 (3.65-5.03)  H  04/17/20  21:37    


 


Hgb  11.8 gm/dl (11.8-15.2)   04/17/20  21:37    


 


Hct  36.9 % (35.5-45.6)   04/17/20  21:37    


 


MCV  63 fl (84-94)  L  04/17/20  21:37    


 


MCH  20 pg (28-32)  L  04/17/20  21:37    


 


MCHC  32 % (32-34)   04/17/20  21:37    


 


RDW  18.5 % (13.2-15.2)  H  04/17/20  21:37    


 


Plt Count  104 K/mm3 (140-440)  L  04/17/20  21:37    


 


Lymph % (Auto)  6.9 % (13.4-35.0)  L  04/17/20  21:37    


 


Mono % (Auto)  4.9 % (0.0-7.3)   04/17/20  21:37    


 


Eos % (Auto)  3.1 % (0.0-4.3)   04/17/20  21:37    


 


Baso % (Auto)  0.4 % (0.0-1.8)   04/17/20  21:37    


 


Lymph #  1.0 K/mm3 (1.2-5.4)  L  04/17/20  21:37    


 


Mono #  0.7 K/mm3 (0.0-0.8)   04/17/20  21:37    


 


Eos #  0.5 K/mm3 (0.0-0.4)  H  04/17/20  21:37    


 


Baso #  0.1 K/mm3 (0.0-0.1)   04/17/20  21:37    


 


Seg Neutrophils %  84.7 % (40.0-70.0)  H  04/17/20  21:37    


 


Seg Neutrophils #  12.8 K/mm3 (1.8-7.7)  H  04/17/20  21:37    


 


Sodium  137 mmol/L (137-145)   04/17/20  21:37    


 


Potassium  4.2 mmol/L (3.6-5.0)   04/17/20  21:37    


 


Chloride  99.6 mmol/L ()   04/17/20  21:37    


 


Carbon Dioxide  27 mmol/L (22-30)   04/17/20  21:37    


 


Anion Gap  15 mmol/L  04/17/20  21:37    


 


BUN  4 mg/dL (9-20)  L  04/17/20  21:37    


 


Creatinine  0.8 mg/dL (0.8-1.5)   04/17/20  21:37    


 


Estimated GFR  > 60 ml/min  04/17/20  21:37    


 


BUN/Creatinine Ratio  5 %  04/17/20  21:37    


 


Glucose  145 mg/dL ()  H  04/17/20  21:37    


 


Calcium  9.3 mg/dL (8.4-10.2)   04/17/20  21:37    


 


Total Bilirubin  0.40 mg/dL (0.1-1.2)   04/17/20  21:37    


 


AST  12 units/L (5-40)   04/17/20  21:37    


 


ALT  13 units/L (7-56)   04/17/20  21:37    


 


Alkaline Phosphatase  83 units/L ()   04/17/20  21:37    


 


Total Protein  7.1 g/dL (6.3-8.2)   04/17/20  21:37    


 


Albumin  2.8 g/dL (3.9-5)  L  04/17/20  21:37    


 


Albumin/Globulin Ratio  0.7 %  04/17/20  21:37    


 


Lipase  982 units/L (13-60)  H  04/17/20  21:54    


 


Urine Color  Yellow  (Yellow)   04/17/20  22:02    


 


Urine Turbidity  Clear  (Clear)   04/17/20  22:02    


 


Urine pH  8.0  (5.0-7.0)  H  04/17/20  22:02    


 


Ur Specific Gravity  1.026  (1.003-1.030)   04/17/20  22:02    


 


Urine Protein  >500 mg/dL (Negative)   04/17/20  22:02    


 


Urine Glucose (UA)  Neg mg/dL (Negative)   04/17/20  22:02    


 


Urine Ketones  Neg mg/dL (Negative)   04/17/20  22:02    


 


Urine Blood  Neg  (Negative)   04/17/20  22:02    


 


Urine Nitrite  Neg  (Negative)   04/17/20  22:02    


 


Urine Bilirubin  Neg  (Negative)   04/17/20  22:02    


 


Urine Urobilinogen  < 2.0 mg/dL (<2.0)   04/17/20  22:02    


 


Ur Leukocyte Esterase  Neg  (Negative)   04/17/20  22:02    


 


Urine WBC (Auto)  1.0 /HPF (0.0-6.0)   04/17/20  22:02    


 


Urine RBC (Auto)  1.0 /HPF (0.0-6.0)   04/17/20  22:02    


 


Urine Mucus  Few /HPF  04/17/20  22:02    








                                    Short CBC











  04/17/20 Range/Units





  21:37 


 


WBC  15.1 H  (4.5-11.0)  K/mm3


 


Hgb  11.8  (11.8-15.2)  gm/dl


 


Hct  36.9  (35.5-45.6)  %


 


Plt Count  104 L  (140-440)  K/mm3








                                       BMP











  04/17/20





  21:37


 


Sodium  137


 


Potassium  4.2


 


Chloride  99.6


 


Carbon Dioxide  27


 


BUN  4 L


 


Creatinine  0.8


 


Glucose  145 H


 


Calcium  9.3








                                 Liver Function











  04/17/20 Range/Units





  21:37 


 


Total Bilirubin  0.40  (0.1-1.2)  mg/dL


 


AST  12  (5-40)  units/L


 


ALT  13  (7-56)  units/L


 


Alkaline Phosphatase  83  ()  units/L


 


Albumin  2.8 L  (3.9-5)  g/dL








                                      Urine











  04/17/20 Range/Units





  22:02 


 


Urine Color  Yellow  (Yellow)  


 


Urine pH  8.0 H  (5.0-7.0)  


 


Ur Specific Gravity  1.026  (1.003-1.030)  


 


Urine Protein  >500  (Negative)  mg/dL


 


Urine Glucose (UA)  Neg  (Negative)  mg/dL














- Imaging and Cardiology


CT scan - abdomen: report reviewed


Imaging and Cardiology: 


CT abdomen








IMPRESSION: 1. Mild acute pancreatitis. 


Bai/IV: 


                                        





IV Catheter Type [Right Distal   INT / Saline Lock


Port Antecubital]                











Assessment and Plan


Advance Directives: Yes (Full code)


VTE prophylaxis?: Chemical


Plan of care discussed with patient/family: Yes





- Patient Problems


(1) Acute pancreatitis


Current Visit: Yes   Status: Acute   


Qualifiers: 


   Pancreatitis type: alcohol induced   Acute pancreatitis complication: 

unspecified   Qualified Code(s): K85.20 - Alcohol induced acute pancreatitis 

without necrosis or infection   


Plan to address problem: 


We will keep the patient n.p.o.


IV fluids for now


Recheck amylase and lipase


Counseled about alcohol intake

















(2) EtOH dependence


Current Visit: Yes   Status: Chronic   


Qualifiers: 


   Substance use status: uncomplicated   Qualified Code(s): F10.20 - Alcohol 

dependence, uncomplicated   


Plan to address problem: 


[facility] protocol initiated








(3) Asthma


Current Visit: Yes   Status: Chronic   


Qualifiers: 


   Asthma severity: mild 


Plan to address problem: 


Continue Singulair once he is on clear liquids








(4) DVT prophylaxis


Current Visit: Yes   Status: Acute   


Plan to address problem: 


On heparin and GI prophylaxis

## 2020-04-21 ENCOUNTER — HOSPITAL ENCOUNTER (EMERGENCY)
Dept: HOSPITAL 5 - ED | Age: 40
Discharge: HOME | End: 2020-04-21
Payer: SELF-PAY

## 2020-04-21 VITALS — SYSTOLIC BLOOD PRESSURE: 144 MMHG | DIASTOLIC BLOOD PRESSURE: 93 MMHG

## 2020-04-21 DIAGNOSIS — Z79.1: ICD-10-CM

## 2020-04-21 DIAGNOSIS — M10.9: ICD-10-CM

## 2020-04-21 DIAGNOSIS — K85.90: ICD-10-CM

## 2020-04-21 DIAGNOSIS — Z79.899: ICD-10-CM

## 2020-04-21 DIAGNOSIS — Z87.891: ICD-10-CM

## 2020-04-21 DIAGNOSIS — R10.9: ICD-10-CM

## 2020-04-21 DIAGNOSIS — J32.9: Primary | ICD-10-CM

## 2020-04-21 DIAGNOSIS — J45.909: ICD-10-CM

## 2020-04-21 PROCEDURE — 99282 EMERGENCY DEPT VISIT SF MDM: CPT

## 2020-04-21 NOTE — EVENT NOTE
ED Screening Note


Date of service: 04/21/20


Time: 22:01


ED Screening Note: 


39 y/o male comes in for abd pain recently discharge from hospital 4/18/20 for 

pancreatic. 





This initial assessment/diagnostic orders/clinical plan/treatment(s) is/are 

subject to change based on patients health status, clinical progression and re-

assessment by fellow clinical providers in the ED. Further treatment and workup 

at subsequent clinical providers discretion. Patient/guardian urged not to elope

from the ED as their condition may be serious if not clinically assessed and 

managed. 





Initial orders include:

## 2020-04-21 NOTE — EMERGENCY DEPARTMENT REPORT
ED Shortness of Breath HPI





- General


Chief Complaint: Dyspnea/Respdistress


Stated Complaint: BRE/ABD PAIN


Time Seen by Provider: 04/21/20 21:58


Source: patient


Mode of arrival: Ambulatory


Limitations: No Limitations





- History of Present Illness


Initial Comments: 





Patient is a 40-year-old male that presents emergency room with complaints of 

asthmatic breathing and difficulty taking a deep breath.  Patient denies cough. 

Patient denies fever and chills.  Patient denies chest pain.  Patient states 

that when he was taking the steroids he felt better.  Patient states he has an 

asthma inhaler which is helping.  Patient denies fever and chills.  Patient 

denies cough.  Patient denies fatigue.








Patient denies recent travel.  Patient denies recent international travel.  

Patient denies exposure to the novel coronavirus.  Patient denies sick contacts.

 Patient denies fever and chills.  Patient denies cough.  Patient denies 

diarrhea.  Patient denies coming in contact with anybody with symptoms of the 

novel coronavirus.





Patient states he has been here multiple times over the past 2 months.  Patient 

states that he most recently was here for abdominal pain and found to have 

pancreatitis.  Patient was admitted and discharged home with oxycodone.  Patient

states he ran out of that medication.  Patient states the abdominal pain is 

getting better but would like a refill until he can have time to go follow-up 

with his primary care or at the specialist.


MD Complaint: shortness of breath, "asthma attack", anxiety


-: Sudden


Consistency: constant


Improves With: rest, bronchodilators


Worsens With: exertion


Context: recent URI


Treatments Prior to Arrival: none





- Related Data


Home Oxygen Therapy: No


                                  Previous Rx's











 Medication  Instructions  Recorded  Last Taken  Type


 


predniSONE [Deltasone] 20 mg PO BID #10 tab 08/06/17 Unknown Rx


 


Amoxicillin/Potassium Clav 1 each PO BID 10 Days #20 tablet 02/20/20 Unknown Rx





[Augmentin 875-125 Tablet]    


 


Ibuprofen [Motrin 800 MG tab] 800 mg PO Q8HR PRN #30 tablet 02/20/20 Unknown Rx


 


predniSONE [Deltasone] 40 mg PO QDAY 5 Days #10 tab 02/20/20 Unknown Rx


 


Albuterol INH(or & Nicu Only) 2 puff IH QID PRN #8.5 gram 04/02/20 Unknown Rx





[ProAir HFA Inhaler]    


 


Benzonatate [Tessalon Perles] 100 mg PO Q8HR #30 capsule 04/02/20 Unknown Rx


 


Cetirizine HCl [Zyrtec 10mg tab] 10 mg PO DAILY #30 tablet 04/02/20 Unknown Rx


 


Ibuprofen [Motrin 600 MG tab] 600 mg PO Q8H PRN #30 tablet 04/02/20 Unknown Rx


 


Prednisone [predniSONE 10 mg 10 mg PO .TAPER #21 tab.ds.pk 04/02/20 Unknown Rx





(6-Day Pack, 21 Tabs)]    


 


Albuterol INH(or & Nicu Only) 2 puff IH QID PRN #8.5 gram 04/18/20 Unknown Rx





[ProAir HFA Inhaler]    


 


Montelukast [Singulair] 10 mg PO QPM #30 tablet 04/18/20 Unknown Rx


 


Doxycycline Hyclate 100 mg PO Q12H #20 tablet.dr 04/21/20 Unknown Rx


 


methylPREDNISolone [Medrol 4MG 4 mg PO DAILY #1 tab.ds.pk 04/21/20 Unknown Rx





DOSEPAK (21 tabs)]    


 


oxyCODONE /ACETAMINOPHEN [Percocet 1 tab PO Q4HR #12 tab 04/21/20 Unknown Rx





5/325 mg]    











                                    Allergies











Allergy/AdvReac Type Severity Reaction Status Date / Time


 


No Known Allergies Allergy   Verified 03/07/20 15:41














ED Review of Systems


ROS: 


Stated complaint: BRE/ABD PAIN


Other details as noted in HPI





Comment: All other systems reviewed and negative





ED Past Medical Hx





- Past Medical History


Previous Medical History?: Yes


Hx Asthma: Yes


Additional medical history: gout, Bronchitis, pancreatitis





- Surgical History


Past Surgical History?: No





- Family History


Family history: no significant





- Social History


Smoking Status: Former Smoker


Substance Use Type: Alcohol





- Medications


Home Medications: 


                                Home Medications











 Medication  Instructions  Recorded  Confirmed  Last Taken  Type


 


predniSONE [Deltasone] 20 mg PO BID #10 tab 08/06/17  Unknown Rx


 


Amoxicillin/Potassium Clav 1 each PO BID 10 Days #20 tablet 02/20/20  Unknown Rx





[Augmentin 875-125 Tablet]     


 


Ibuprofen [Motrin 800 MG tab] 800 mg PO Q8HR PRN #30 tablet 02/20/20  Unknown Rx


 


predniSONE [Deltasone] 40 mg PO QDAY 5 Days #10 tab 02/20/20  Unknown Rx


 


Albuterol INH(or & Nicu Only) 2 puff IH QID PRN #8.5 gram 04/02/20  Unknown Rx





[ProAir HFA Inhaler]     


 


Benzonatate [Tessalon Perles] 100 mg PO Q8HR #30 capsule 04/02/20  Unknown Rx


 


Cetirizine HCl [Zyrtec 10mg tab] 10 mg PO DAILY #30 tablet 04/02/20  Unknown Rx


 


Ibuprofen [Motrin 600 MG tab] 600 mg PO Q8H PRN #30 tablet 04/02/20  Unknown Rx


 


Prednisone [predniSONE 10 mg 10 mg PO .TAPER #21 tab.ds.pk 04/02/20  Unknown Rx





(6-Day Pack, 21 Tabs)]     


 


Albuterol INH(or & Nicu Only) 2 puff IH QID PRN #8.5 gram 04/18/20  Unknown Rx





[ProAir HFA Inhaler]     


 


Montelukast [Singulair] 10 mg PO QPM #30 tablet 04/18/20  Unknown Rx


 


Doxycycline Hyclate 100 mg PO Q12H #20 tablet.dr 04/21/20  Unknown Rx


 


methylPREDNISolone [Medrol 4MG 4 mg PO DAILY #1 tab.ds.pk 04/21/20  Unknown Rx





DOSEPAK (21 tabs)]     


 


oxyCODONE /ACETAMINOPHEN [Percocet 1 tab PO Q4HR #12 tab 04/21/20  Unknown Rx





5/325 mg]     














ED Physical Exam





- General


Limitations: No Limitations


General appearance: alert, in no apparent distress





- Head


Head exam: Present: atraumatic, normocephalic





- Eye


Eye exam: Present: normal appearance, PERRL


Pupils: Present: normal accommodation





- ENT


ENT exam: Present: mucous membranes moist, other (Bilateral maxillary sinus 

tenderness noted.)





- Neck


Neck exam: Present: normal inspection





- Respiratory


Respiratory exam: Present: normal lung sounds bilaterally.  Absent: respiratory 

distress, wheezes, rales





- Cardiovascular


Cardiovascular Exam: Present: regular rate, normal rhythm.  Absent: systolic 

murmur, diastolic murmur, rubs, gallop





- GI/Abdominal


GI/Abdominal exam: Present: soft, normal bowel sounds.  Absent: distended, 

tenderness, guarding





- Rectal


Rectal exam: Present: deferred





- Extremities Exam


Extremities exam: Present: normal inspection





- Back Exam


Back exam: Present: normal inspection





- Neurological Exam


Neurological exam: Present: alert, oriented X3





- Psychiatric


Psychiatric exam: Present: anxious





- Skin


Skin exam: Present: warm, dry, intact, normal color.  Absent: rash





ED Course





                                   Vital Signs











  04/21/20





  21:58


 


Temperature 97.8 F


 


Pulse Rate 100 H


 


Respiratory 20





Rate 


 


Blood Pressure 144/93


 


O2 Sat by Pulse 99





Oximetry 














- Reevaluation(s)


Reevaluation #1: 


I discussed all clinical findings with patient.  I discussed plan of care with 

patient.  Patient agrees with plan of care.  Patient is stable for discharge.  

Patient will be discharged home.  Patient given discharge instructions.  Patient

voiced understanding of discharge instructions.


04/21/20 22:15








ED Medical Decision Making





- Medical Decision Making





Patient is a 40-year-old male who presents emergency room with complaints of 

difficulties breathing and abdominal pain.  Patient abdominal pain is actually 

improved from his previous visit where he was found to have pancreatitis.  

Patient just requested a refill of his pain medication so that he can make it to

his follow-up appointment with his primary care and specialist.  Patient states 

his pain is dramatically better from when he was discharged.  Patient's clinical

findings are consistent with a sinus infection.  Patient will be given 

antibiotics and steroids.  Patient's lung sounds are clear.  Patient given 

discharge instructions.  Patient instructed to follow-up with his primary care 

soon as possible.





- Differential Diagnosis


Sinusitis, URI, asthma, bronchitis, abdominal pain.


Critical care attestation.: 


If time is entered above; I have spent that time in minutes in the direct care 

of this critically ill patient, excluding procedure time.








ED Disposition


Clinical Impression: 


Sinusitis


Qualifiers:


 Sinusitis location: maxillary Chronicity: acute Recurrence: non-recurrent 

Qualified Code(s): J01.00 - Acute maxillary sinusitis, unspecified





Abdominal pain


Qualifiers:


 Abdominal location: generalized Qualified Code(s): R10.84 - Generalized 

abdominal pain





Disposition: DC-01 TO HOME OR SELFCARE


Is pt being admited?: No


Does the pt Need Aspirin: No


Condition: Stable


Instructions:  Sinusitis (ED), Pancreatitis (ED)


Additional Instructions: 


Patient to follow-up with primary care in 2 to 3 days.  Patient to follow-up 

with gastroenterology in 2 to 3 days.  Patient to rest.  Patient to increase 

water.  Patient to avoid fatty foods and alcohol.  Patient to take Tylenol or 

ibuprofen as needed for pain.  Patient to take meds as directed.  Patient to 

return to the ER if condition worsens, changes or new symptoms arise.


Prescriptions: 


Doxycycline Hyclate 100 mg PO Q12H #20 tablet.


methylPREDNISolone [Medrol 4MG DOSEPAK (21 tabs)] 4 mg PO DAILY #1 tab.ds.pk


oxyCODONE /ACETAMINOPHEN [Percocet 5/325 mg] 1 tab PO Q4HR #12 tab


Referrals: 


CHELSIE WILLIS MD [Staff Physician] - 2-3 Days


Time of Disposition: 22:19

## 2020-07-06 ENCOUNTER — HOSPITAL ENCOUNTER (EMERGENCY)
Dept: HOSPITAL 5 - ED | Age: 40
Discharge: HOME | End: 2020-07-06
Payer: SELF-PAY

## 2020-07-06 VITALS — DIASTOLIC BLOOD PRESSURE: 87 MMHG | SYSTOLIC BLOOD PRESSURE: 143 MMHG

## 2020-07-06 DIAGNOSIS — F17.200: ICD-10-CM

## 2020-07-06 DIAGNOSIS — J45.901: Primary | ICD-10-CM

## 2020-07-06 PROCEDURE — 71046 X-RAY EXAM CHEST 2 VIEWS: CPT

## 2020-07-06 PROCEDURE — 99283 EMERGENCY DEPT VISIT LOW MDM: CPT

## 2020-07-06 PROCEDURE — 96372 THER/PROPH/DIAG INJ SC/IM: CPT

## 2020-07-06 NOTE — EMERGENCY DEPARTMENT REPORT
ED Asthma HPI





- General


Chief Complaint: Dyspnea/Respdistress


Stated Complaint: ASTHMA/SOB/CONGESTION


Time Seen by Provider: 20 15:55


Source: patient


Mode of arrival: Ambulatory


Limitations: No Limitations





- History of Present Illness


Initial Comments: 





This is a 40-year-old male nontoxic, well nourished in appearance, no acute 

signs of distress presents to the ED with c/o of acute on chronic asthma 

exacerbation. Patient also stated has a dry nonproductivce cough. Patient denies

any sick contact.  Patient denies any recent travels, long car, recent hospital 

stays.  Patient denies any calf pain or calf tenderness.  Patient denies any 

chest pain, fever, chills, nausea, vomiting, hemoptysis, numbness, tingling, 

headache or stiff neck.  Past medical history includes asthma.


MD Complaint: "asthma attack", shortness of breath, wheezing


-: days(s)


Asthma History: childhood onset


Severity: mild


Context: none known


Associated Symptoms: dry cough


Treatments Prior to Arrival: inhaled bronchodilator





- Related Data


Current Asthma Therapy: inhaled bronchodilator


                                  Previous Rx's











 Medication  Instructions  Recorded  Last Taken  Type


 


predniSONE [Deltasone] 20 mg PO BID #10 tab 17 Unknown Rx


 


Amoxicillin/Potassium Clav 1 each PO BID 10 Days #20 tablet 20 Unknown Rx





[Augmentin 875-125 Tablet]    


 


Ibuprofen [Motrin 800 MG tab] 800 mg PO Q8HR PRN #30 tablet 20 Unknown Rx


 


Albuterol Mdi (or & Nicu Only) 2 puff IH QID PRN #8.5 gram 20 Unknown Rx





[ProAir HFA Inhaler]    


 


Cetirizine HCl [Zyrtec 10mg tab] 10 mg PO DAILY #30 tablet 20 Unknown Rx


 


Ibuprofen [Motrin 600 MG tab] 600 mg PO Q8H PRN #30 tablet 20 Unknown Rx


 


Montelukast [Singulair] 10 mg PO QPM #30 tablet 20 Unknown Rx


 


Doxycycline Hyclate 100 mg PO Q12H #20 tablet. 20 Unknown Rx


 


methylPREDNISolone [Medrol 4MG 4 mg PO DAILY #1 tab.ds.pk 20 Unknown Rx





DOSEPAK (21 tabs)]    


 


oxyCODONE /ACETAMINOPHEN [Percocet 1 tab PO Q4HR #12 tab 20 Unknown Rx





5/325 mg]    


 


Amoxicillin/Potassium Clav 1 each PO Q12H #20 tablet 20 Unknown Rx





[Augmentin 875-125 Tablet]    


 


Prednisone [predniSONE 10 mg 10 mg PO .TAPER #21 tab.ds.pk 20 Unknown Rx





(6-Day Pack, 21 Tabs)]    


 


Albuterol Mdi (or & Nicu Only) 2 puff IH QID PRN #8.5 gram 20 Unknown Rx





[ProAir HFA Inhaler]    


 


Azithromycin [Zithromax Z-CRISTY] 250 mg PO DAILY #6 tab 20 Unknown Rx


 


Benzonatate [Tessalon Perles] 100 mg PO Q8HR #30 capsule 20 Unknown Rx


 


predniSONE [Deltasone] 40 mg PO QDAY 5 Days #10 tab 20 Unknown Rx


 


Albuterol Mdi (or & Nicu Only) 2 puff IH QID PRN #8.5 gram 20 Unknown Rx





[ProAir HFA Inhaler]    


 


Prednisone [predniSONE 10 mg 10 mg PO .TAPER #1 tab.ds.pk 20 Unknown Rx





(6-Day Pack, 21 Tabs)]    











                                    Allergies











Allergy/AdvReac Type Severity Reaction Status Date / Time


 


No Known Allergies Allergy   Verified 20 15:17














ED Review of Systems


ROS: 


Stated complaint: ASTHMA/SOB/CONGESTION


Other details as noted in HPI





Constitutional: denies: chills, fever


Eyes: denies: eye pain, eye discharge, vision change


ENT: denies: ear pain, throat pain


Respiratory: shortness of breath, wheezing.  denies: cough


Cardiovascular: denies: chest pain, palpitations


Endocrine: no symptoms reported


Gastrointestinal: denies: abdominal pain, nausea, diarrhea


Genitourinary: denies: urgency, dysuria


Musculoskeletal: denies: back pain, joint swelling, arthralgia


Skin: denies: rash, lesions


Neurological: denies: headache, weakness, paresthesias


Psychiatric: denies: anxiety, depression


Hematological/Lymphatic: denies: easy bleeding, easy bruising





ED Past Medical Hx





- Past Medical History


Hx Asthma: Yes


Additional medical history: gout, Bronchitis, pancreatitis





- Surgical History


Past Surgical History?: No





- Social History


Smoking Status: Current Every Day Smoker


Substance Use Type: None





- Medications


Home Medications: 


                                Home Medications











 Medication  Instructions  Recorded  Confirmed  Last Taken  Type


 


predniSONE [Deltasone] 20 mg PO BID #10 tab 17  Unknown Rx


 


Amoxicillin/Potassium Clav 1 each PO BID 10 Days #20 tablet 20  Unknown Rx





[Augmentin 875-125 Tablet]     


 


Ibuprofen [Motrin 800 MG tab] 800 mg PO Q8HR PRN #30 tablet 20  Unknown Rx


 


Albuterol Mdi (or & Nicu Only) 2 puff IH QID PRN #8.5 gram 20  Unknown Rx





[ProAir HFA Inhaler]     


 


Cetirizine HCl [Zyrtec 10mg tab] 10 mg PO DAILY #30 tablet 20  Unknown Rx


 


Ibuprofen [Motrin 600 MG tab] 600 mg PO Q8H PRN #30 tablet 20  Unknown Rx


 


Montelukast [Singulair] 10 mg PO QPM #30 tablet 20  Unknown Rx


 


Doxycycline Hyclate 100 mg PO Q12H #20 tablet. 20  Unknown Rx


 


methylPREDNISolone [Medrol 4MG 4 mg PO DAILY #1 tab.ds.pk 20  Unknown Rx





DOSEPAK (21 tabs)]     


 


oxyCODONE /ACETAMINOPHEN [Percocet 1 tab PO Q4HR #12 tab 20  Unknown Rx





5/325 mg]     


 


Amoxicillin/Potassium Clav 1 each PO Q12H #20 tablet 20  Unknown Rx





[Augmentin 875-125 Tablet]     


 


Prednisone [predniSONE 10 mg 10 mg PO .TAPER #21 tab.ds.pk 20  Unknown Rx





(6-Day Pack, 21 Tabs)]     


 


Albuterol Mdi (or & Nicu Only) 2 puff IH QID PRN #8.5 gram 20  Unknown Rx





[ProAir HFA Inhaler]     


 


Azithromycin [Zithromax Z-CRISTY] 250 mg PO DAILY #6 tab 20  Unknown Rx


 


Benzonatate [Tessalon Perles] 100 mg PO Q8HR #30 capsule 20  Unknown Rx


 


predniSONE [Deltasone] 40 mg PO QDAY 5 Days #10 tab 20  Unknown Rx


 


Albuterol Mdi (or & Nicu Only) 2 puff IH QID PRN #8.5 gram 20  Unknown Rx





[ProAir HFA Inhaler]     


 


Prednisone [predniSONE 10 mg 10 mg PO .TAPER #1 tab.ds.pk 20  Unknown Rx





(6-Day Pack, 21 Tabs)]     














ED Physical Exam





- General


Limitations: No Limitations


General appearance: alert, in no apparent distress





- Head


Head exam: Present: atraumatic, normocephalic





- Eye


Eye exam: Present: normal appearance





- Neck


Neck exam: Present: normal inspection, full ROM.  Absent: tenderness, 

meningismus, lymphadenopathy





- Respiratory


Respiratory exam: Present: wheezes (diffuse).  Absent: respiratory distress, 

rales, rhonchi, stridor, chest wall tenderness, accessory muscle use, decreased 

breath sounds, prolonged expiratory





- Cardiovascular


Cardiovascular Exam: Present: regular rate, normal rhythm, normal heart sounds. 

Absent: irregular rhythm, systolic murmur, diastolic murmur, rubs, gallop





- Extremities Exam


Extremities exam: Present: normal inspection, full ROM





- Back Exam


Back exam: Present: normal inspection, full ROM.  Absent: tenderness, CVA 

tenderness (R), CVA tenderness (L), muscle spasm, paraspinal tenderness, 

vertebral tenderness, rash noted





- Neurological Exam


Neurological exam: Present: alert, oriented X3, normal gait





- Psychiatric


Psychiatric exam: Present: normal affect, normal mood





- Skin


Skin exam: Present: warm, dry, intact, normal color.  Absent: rash





ED Course


                                   Vital Signs











  20





  15:19 15:56


 


Temperature 97.5 F L 97.5 F L


 


Pulse Rate 91 H 89


 


Respiratory 22 22





Rate  


 


Blood Pressure 143/87 143/87


 


O2 Sat by Pulse 95 96





Oximetry  














- Reevaluation(s)


Reevaluation #1: 





20 16:49


Patient is speaking in full sentences with no signs of distress noted.





ED Medical Decision Making





- Radiology Data











Referring Physician: ARNIE OLMSTEAD


 


Patient Name: YOLY NAIR


 


Patient ID: [id number]


 


YOB: 1980


 


Sex: Male


 


Accession: [id number]


 


Report Date: 2020


 


Report Status: Finalized








[facility] [address] [place] [address] 

XRay Report Signed Patient: YOLY NAIR MR#: [id number]  : 1980 

Acct:[id number] Age/Sex: 40 / M ADM Date: 20 Loc: ED Attending Dr: 

Ordering Physician: BEAU CALLEJAS Date of Service: 20 Procedure(s): 

XR chest routine 2V Accession Number(s): [id number] cc: BEAU CALLEJAS Fluoro 

Time In Minutes: CHEST PA AND LATERAL VIEWS INDICATION: cough, sob, wheezing. 

COMPARISON: 2020 FINDINGS: Support devices: None. Heart: Within normal 

limits. Lungs/Pleura: No acute pulmonary or pleural findings. IMPRESSION: 1. No 

acute findings. Signer Name: Neeraj De Paz MD Signed: 2020 5:11 PM Workstation 

Name: [id number] Transcribed By: JEFE Dictated By: Neeraj De Paz MD 

Electronically Authenticated By: Neeraj De Paz MD Signed Date/Time: 20 DD/DT: 20 TD/TT: 





- Medical Decision Making





This is a 40-year-old male that presents with asthma exacerbation.  Patient is 

stable and was examined by me.  Chest x-ray has been obtained and dictated by 

the radiologist within normal limits.  Patient is notified of the x-ray report 

with no questions noted by the patient.  Patient did receive breathing treatment

and steroids in the ED which patient the symptoms has resolved and subsided.  

Posttreatment and there is no wheezing upon auscultation.  Patient is discharged

with albuterol and prednisone.  Patient was referred to Follow-up with a primary

care doctor in 3-5 days or if symptoms worsen and continue return to emergency 

room as soon as possible.  At time of discharge, the patient does not seem toxic

or ill in appearance.  No acute signs of distress noted.  Patient agrees to 

discharge treatment plan of care.  No further questions noted by the patient.





This chart is dictated with using Dragon Dictation Program


Critical care attestation.: 


If time is entered above; I have spent that time in minutes in the direct care 

of this critically ill patient, excluding procedure time.








ED Disposition


Clinical Impression: 


Asthma exacerbation


Qualifiers:


 Asthma severity: mild Asthma persistence: intermittent Qualified Code(s): 

J45.21 - Mild intermittent asthma with (acute) exacerbation





Disposition: - TO HOME OR SELFCARE


Is pt being admited?: No


Does the pt Need Aspirin: No


Condition: Stable


Instructions:  Asthma (ED)


Additional Instructions: 


Follow-up with a primary care doctor in 3-5 days or if symptoms worsen and 

continue return to emergency room as soon as possible. 


Prescriptions: 


Prednisone [predniSONE 10 mg (6-Day Pack, 21 Tabs)] 10 mg PO .TAPER #1 tab.ds.pk


Albuterol Mdi (or & Nicu Only) [ProAir HFA Inhaler] 2 puff IH QID PRN #8.5 gram


 PRN Reason: Shortness Of Breath


Referrals: 


PRIMARY CARE,MD [Primary Care Provider] - 3-5 Days


BREE LARES MD [Staff Physician] - 3-5 Days


[facility] [Provider Group] - 3-5 Days


Forms:  Work/School Release Form(ED)

## 2020-07-06 NOTE — XRAY REPORT
CHEST PA AND LATERAL VIEWS



INDICATION: 

cough, sob, wheezing.



COMPARISON: 

6/27/2020



FINDINGS:



Support devices: None.



Heart: Within normal limits. 



Lungs/Pleura: No acute pulmonary or pleural findings.  







IMPRESSION:

1. No acute findings.



Signer Name: Neeraj De Paz MD 

Signed: 7/6/2020 5:11 PM

Workstation Name: [id number]

## 2020-07-06 NOTE — EVENT NOTE
ED Screening Note


ED Screening Note: 





Asthma exacerbation began 2 days ago


Uses an albuterol inhaler


Has an associated cough


No fever, no vomiting, no diarrhea


occasional smoker





This initial assessment/diagnostic orders/clinical plan/treatment(s) is/are 

subject to change based on patients health status, clinical progression and re-

assessment by fellow clinical providers in the ED. Further treatment and workup 

at subsequent clinical providers discretion. Patient/guardian urged not to elope

from the ED as their condition may be serious if not clinically assessed and 

managed. 





Initial orders include: 


Wheezing and rhonchi diffusely on exam


Nebs and steroids ordered


Chest x-ray ordered


Patient sent to [facility]

## 2020-10-13 ENCOUNTER — HOSPITAL ENCOUNTER (INPATIENT)
Dept: HOSPITAL 5 - ED | Age: 40
LOS: 3 days | Discharge: HOME | DRG: 871 | End: 2020-10-16
Attending: INTERNAL MEDICINE | Admitting: INTERNAL MEDICINE
Payer: SELF-PAY

## 2020-10-13 DIAGNOSIS — J45.21: ICD-10-CM

## 2020-10-13 DIAGNOSIS — F17.213: ICD-10-CM

## 2020-10-13 DIAGNOSIS — Z20.828: ICD-10-CM

## 2020-10-13 DIAGNOSIS — F10.20: ICD-10-CM

## 2020-10-13 DIAGNOSIS — J96.01: ICD-10-CM

## 2020-10-13 DIAGNOSIS — J18.9: ICD-10-CM

## 2020-10-13 DIAGNOSIS — Z71.6: ICD-10-CM

## 2020-10-13 DIAGNOSIS — A41.9: Primary | ICD-10-CM

## 2020-10-13 DIAGNOSIS — Z79.51: ICD-10-CM

## 2020-10-13 DIAGNOSIS — Z82.49: ICD-10-CM

## 2020-10-13 DIAGNOSIS — M10.9: ICD-10-CM

## 2020-10-13 LAB
ALBUMIN SERPL-MCNC: 1.3 G/DL (ref 3.9–5)
ALT SERPL-CCNC: 9 UNITS/L (ref 7–56)
APTT BLD: 36.5 SEC. (ref 24.2–36.6)
BASOPHILS # (AUTO): 0 K/MM3 (ref 0–0.1)
BASOPHILS NFR BLD AUTO: 0.3 % (ref 0–1.8)
BUN SERPL-MCNC: 9 MG/DL (ref 9–20)
BUN/CREAT SERPL: 11 %
CALCIUM SERPL-MCNC: 7.7 MG/DL (ref 8.4–10.2)
CRP SERPL-MCNC: 7.4 MG/DL (ref 0–1.3)
EOSINOPHIL # BLD AUTO: 0.4 K/MM3 (ref 0–0.4)
EOSINOPHIL NFR BLD AUTO: 3.1 % (ref 0–4.3)
HCO3 BLDA-SCNC: 25.3 MMOL/L (ref 20–26)
HCT VFR BLD CALC: 39.6 % (ref 35.5–45.6)
HEMOLYSIS INDEX: 9
HGB BLD-MCNC: 12.5 GM/DL (ref 11.8–15.2)
INR PPP: 1.17 (ref 0.87–1.13)
LYMPHOCYTES # BLD AUTO: 2.2 K/MM3 (ref 1.2–5.4)
LYMPHOCYTES NFR BLD AUTO: 15.6 % (ref 13.4–35)
MCHC RBC AUTO-ENTMCNC: 32 % (ref 32–34)
MCV RBC AUTO: 65 FL (ref 84–94)
MONOCYTES # (AUTO): 0.5 K/MM3 (ref 0–0.8)
MONOCYTES % (AUTO): 3.7 % (ref 0–7.3)
PCO2 BLDA: 38.3 MM HG
PH BLDA: 7.44 PH UNITS (ref 7.35–7.45)
PLATELET # BLD: 247 K/MM3 (ref 140–440)
PO2 BLDA: 77.1 MM HG (ref 80–90)
RBC # BLD AUTO: 6.05 M/MM3 (ref 3.65–5.03)

## 2020-10-13 PROCEDURE — 80053 COMPREHEN METABOLIC PANEL: CPT

## 2020-10-13 PROCEDURE — 86140 C-REACTIVE PROTEIN: CPT

## 2020-10-13 PROCEDURE — 85025 COMPLETE CBC W/AUTO DIFF WBC: CPT

## 2020-10-13 PROCEDURE — 93306 TTE W/DOPPLER COMPLETE: CPT

## 2020-10-13 PROCEDURE — 85379 FIBRIN DEGRADATION QUANT: CPT

## 2020-10-13 PROCEDURE — 71275 CT ANGIOGRAPHY CHEST: CPT

## 2020-10-13 PROCEDURE — 99406 BEHAV CHNG SMOKING 3-10 MIN: CPT

## 2020-10-13 PROCEDURE — 93005 ELECTROCARDIOGRAM TRACING: CPT

## 2020-10-13 PROCEDURE — 71045 X-RAY EXAM CHEST 1 VIEW: CPT

## 2020-10-13 PROCEDURE — 84145 PROCALCITONIN (PCT): CPT

## 2020-10-13 PROCEDURE — 94644 CONT INHLJ TX 1ST HOUR: CPT

## 2020-10-13 PROCEDURE — 94640 AIRWAY INHALATION TREATMENT: CPT

## 2020-10-13 PROCEDURE — 84484 ASSAY OF TROPONIN QUANT: CPT

## 2020-10-13 PROCEDURE — 80048 BASIC METABOLIC PNL TOTAL CA: CPT

## 2020-10-13 PROCEDURE — 36415 COLL VENOUS BLD VENIPUNCTURE: CPT

## 2020-10-13 PROCEDURE — 96375 TX/PRO/DX INJ NEW DRUG ADDON: CPT

## 2020-10-13 PROCEDURE — 96365 THER/PROPH/DIAG IV INF INIT: CPT

## 2020-10-13 PROCEDURE — 82728 ASSAY OF FERRITIN: CPT

## 2020-10-13 PROCEDURE — 85730 THROMBOPLASTIN TIME PARTIAL: CPT

## 2020-10-13 PROCEDURE — 85610 PROTHROMBIN TIME: CPT

## 2020-10-13 PROCEDURE — 82947 ASSAY GLUCOSE BLOOD QUANT: CPT

## 2020-10-13 PROCEDURE — 71046 X-RAY EXAM CHEST 2 VIEWS: CPT

## 2020-10-13 PROCEDURE — 82803 BLOOD GASES ANY COMBINATION: CPT

## 2020-10-13 PROCEDURE — 83615 LACTATE (LD) (LDH) ENZYME: CPT

## 2020-10-13 RX ADMIN — METHYLPREDNISOLONE SODIUM SUCCINATE SCH MG: 40 INJECTION, POWDER, FOR SOLUTION INTRAMUSCULAR; INTRAVENOUS at 23:53

## 2020-10-13 RX ADMIN — Medication SCH MG: at 20:39

## 2020-10-13 RX ADMIN — Medication SCH ML: at 21:52

## 2020-10-13 RX ADMIN — FOLIC ACID SCH MG: 1 TABLET ORAL at 20:39

## 2020-10-13 RX ADMIN — MULTIVITAMIN TABLET SCH EACH: TABLET at 20:39

## 2020-10-13 RX ADMIN — METHYLPREDNISOLONE SODIUM SUCCINATE SCH MG: 40 INJECTION, POWDER, FOR SOLUTION INTRAMUSCULAR; INTRAVENOUS at 18:33

## 2020-10-13 RX ADMIN — HEPARIN SODIUM SCH UNIT: 5000 INJECTION, SOLUTION INTRAVENOUS; SUBCUTANEOUS at 21:50

## 2020-10-13 NOTE — XRAY REPORT
XR chest routine 2V



INDICATION / CLINICAL INFORMATION:

SOB, cough, asthma hx



COMPARISON: 

July 27, 2020



FINDINGS:



SUPPORT DEVICES: None.



HEART / MEDIASTINUM: No significant abnormality. 



LUNGS / PLEURA: Lungs are clear.  Costophrenic sulci are sharp. No pneumothorax.



ADDITIONAL FINDINGS: No significant additional findings.



IMPRESSION:

1. No acute findings.



Signer Name: Octavio Krueger MD 

Signed: 10/13/2020 8:57 AM

Workstation Name: [id number]

## 2020-10-13 NOTE — HISTORY AND PHYSICAL REPORT
History of Present Illness


Chief complaint: 





Its hard to breathe


History of present illness: 


41 YO Male with Mild Intermittent Asthma, Nicotine Dependence, ETOH Dependence 

complicated by Pancreatitis presents to ED for evaluation. Pt states that he has

experienced shortness of breath over the past 5 days with persistently worsening

symptoms over the same timeframe.  Patient knowledges decreased exercise 

tolerance, dyspnea on exertion as well as dyspnea at rest.  Patient also 

acknowledges dry cough, loss of smell and taste.  Patient transported to [facility] via

private vehicle for further care and evaluation. Pt seen and evaluated in the 

emergency department.  Lab and imaging studies reviewed.  Patient underwent CT 

scan of the chest and was found to have bilateral pneumonia as well as systemic 

inflammatory response syndrome.  Patient also found to have a pulse oximetry of 

86% with exertion which is consistent with acute hypoxemic respiratory failure. 

Patient initiated on pneumonia protocol as well as initiated on COVID-19 

protocol.  Patient treated with IV antibiotic therapy as well as IV steroid 

therapy and admitted to medical floor due to increased risk of decompensation.  

Patient denies fever, chills, chest pain, palpitations, productive cough, skin 

rash, or known ill contacts, or known exposure COVID-19.  Prior admission on 

4/18/2020 reviewed.  All medication listed at time of admission has been rec

onciled.





Past History


Past Medical History: other (See HPI)


Past Surgical History: No surgical history, Other (Reviewed)


Social history: smoking, alcohol abuse


Family history: hypertension





Medications and Allergies


                                    Allergies











Allergy/AdvReac Type Severity Reaction Status Date / Time


 


No Known Allergies Allergy   Verified 07/06/20 15:17











                                Home Medications











 Medication  Instructions  Recorded  Confirmed  Last Taken  Type


 


ALBUTEROL NEB's [Proventil 0.083% 2.5 mg IH TID PRN 10/13/20 10/13/20 Unknown 

History





NEBS]     


 


Furosemide [Lasix TAB] 40 mg PO DAILY 10/13/20 10/13/20 Unknown History


 


Lisinopril 20 mg PO BID 10/13/20 10/13/20 Unknown History











Active Meds: 


Active Medications





Ceftriaxone Sodium 500 mg/ (Sodium Chloride)  50 mls @ 100 mls/hr IV ONCE ONE; 

Protocol


   Stop: 10/13/20 15:42


Azithromycin 500 mg/ Sodium (Chloride)  250 mls @ 250 mls/hr IV ONCE ONE; 

Protocol


   Stop: 10/13/20 16:12











Review of Systems


Constitutional: weakness, malaise, lethargy, no weight loss, no weight gain, no 

fever, no chills


Ears, nose, mouth and throat: other (Loss of sensation of smell and taste), no 

ear pain, no ear discharge, no tinnitis, no decreased hearing, no nose pain


Cardiovascular: no chest pain, no orthopnea, no palpitations, no rapid/irregular

heart beat


Respiratory: shortness of breath, no cough with sputum, no excessive sputum, no 

sleep apnea


Gastrointestinal: no nausea, no vomiting, no diarrhea, no constipation


Genitourinary Male: no hematuria, no flank pain, no discharge, no urinary 

frequency, no urinary hesitancy


Rectal: no pain, no incontinence, no bleeding


Musculoskeletal: no neck stiffness, no arm numbness/tingling, no low back pain, 

no shooting leg pain, no leg numbness/tingling


Integumentary: no rash, no redness, no sores, no wounds, no jaundice


Neurological: no head injury, no transient paralysis, no paralysis, no weakness,

no parathesias, no numbness, no seizures, no ataxia


Psychiatric: no anxiety, no memory loss, no hypersomnia, no change in appetite, 

no suicidal ideation, no disorientation


Endocrine: no cold intolerance, no heat intolerance, no polyphagia, no nocturia


Hematologic/Lymphatic: no easy bruising, no easy bleeding


Allergic/Immunologic: no allergic rhinitis, no wheezing





Exam





- Constitutional


Vitals: 


                                        











Temp Pulse Resp BP Pulse Ox


 


 98.6 F   100 H  18   130/79   91 


 


 10/13/20 07:53  10/13/20 08:10  10/13/20 15:12  10/13/20 07:53  10/13/20 15:12











General appearance: Present: mild distress





- EENT


Eyes: Present: PERRL


ENT: hearing intact, clear oral mucosa





- Neck


Neck: Present: supple, normal ROM





- Respiratory


Respiratory effort: labored, accessory muscle use, stridor


Respiratory: bilateral: diminished, rhonchi





- Cardiovascular


Heart Sounds: Present: S1 & S2.  Absent: rub, click





- Extremities


Extremities: pulses symmetrical, No edema


Peripheral Pulses: within normal limits





- Abdominal


General gastrointestinal: Present: soft, non-tender, non-distended, normal bowel

sounds


Male genitourinary: Present: normal





- Integumentary


Integumentary: Present: clear, warm, dry





- Musculoskeletal


Musculoskeletal: gait normal, strength equal bilaterally





- Psychiatric


Psychiatric: appropriate mood/affect, intact judgment & insight





- Neurologic


Neurologic: CNII-XII intact, moves all extremities





HEART Score





- HEART Score


Troponin: 


                                        











Troponin T  < 0.010 ng/mL (0.00-0.029)   10/13/20  10:47    














Results





- Labs


CBC & Chem 7: 


                                 10/13/20 10:47





                                 10/13/20 15:23


Labs: 


                              Abnormal lab results











  10/13/20 10/13/20 10/13/20 Range/Units





  10:47 10:47 10:47 


 


WBC  14.2 H    (4.5-11.0)  K/mm3


 


RBC  6.05 H    (3.65-5.03)  M/mm3


 


MCV  65 L    (84-94)  fl


 


MCH  21 L    (28-32)  pg


 


RDW  17.3 H    (13.2-15.2)  %


 


Seg Neutrophils %  77.3 H    (40.0-70.0)  %


 


Seg Neutrophils #  11.0 H    (1.8-7.7)  K/mm3


 


PT    15.0 H  (12.2-14.9)  Sec.


 


INR    1.17 H  (0.87-1.13)  


 


D-Dimer    967.21 H  (0-234)  ng/mlDDU


 


ABG pO2     (80.0-90.0)  mm Hg


 


Oxyhemoglobin     (95.0-99.0)  %


 


Glucose   104 H   ()  mg/dL


 


Calcium   7.7 L   (8.4-10.2)  mg/dL


 


Total Protein   5.9 L   (6.3-8.2)  g/dL


 


Albumin   1.3 L   (3.9-5)  g/dL














  10/13/20 Range/Units





  14:05 


 


WBC   (4.5-11.0)  K/mm3


 


RBC   (3.65-5.03)  M/mm3


 


MCV   (84-94)  fl


 


MCH   (28-32)  pg


 


RDW   (13.2-15.2)  %


 


Seg Neutrophils %   (40.0-70.0)  %


 


Seg Neutrophils #   (1.8-7.7)  K/mm3


 


PT   (12.2-14.9)  Sec.


 


INR   (0.87-1.13)  


 


D-Dimer   (0-234)  ng/mlDDU


 


ABG pO2  77.1 L  (80.0-90.0)  mm Hg


 


Oxyhemoglobin  93.8 L  (95.0-99.0)  %


 


Glucose   ()  mg/dL


 


Calcium   (8.4-10.2)  mg/dL


 


Total Protein   (6.3-8.2)  g/dL


 


Albumin   (3.9-5)  g/dL














Assessment and Plan





- Patient Problems


(1) Acute hypoxemic respiratory failure


Current Visit: Yes   Status: Acute   


Plan to address problem: 


Chest x-ray, CTA chest, d-dimer, arterial blood gas, supplemental oxygen, pulse 

oximetry, nebulizer therapy via MDI via spacer, prone positioning while in bed








(2) PNA (pneumonia)


Current Visit: Yes   Status: Acute   


Qualifiers: 


   Pneumonia type: due to unspecified organism   Laterality: bilateral   Lung 

location: unspecified part of lung   Qualified Code(s): J18.9 - Pneumonia, 

unspecified organism   


Plan to address problem: 


Pneumonia protocol: Chest x-ray, CTA chest, supplemental oxygen, pulse oximetry,

IV antibiotic therapy, blood culture,








(3) Suspected COVID-19 virus infection


Current Visit: Yes   Status: Acute   


Plan to address problem: 


Coronavirus protocol initiated in the emergency department: Coronavirus PCR 

ordered and is pending at the time of admission, isolation precaution, contact 

precaution, prone positioning while in bed, IV steroid therapy,








(4) Systemic inflammatory response syndrome


Current Visit: Yes   Status: Acute   


Plan to address problem: 


CBC, CMP, chest x-ray, urinalysis, blood culture, IV antibiotic therapy.








(5) Nicotine dependence


Current Visit: Yes   Status: Acute   


Qualifiers: 


   Nicotine product type: cigarettes   Substance use status: in withdrawal   Renan

lified Code(s): F17.213 - Nicotine dependence, cigarettes, with withdrawal   


Plan to address problem: 


Smoking cessation, supportive care, behavior change counseling, +15 minutes.








(6) EtOH dependence


Current Visit: No   Status: Chronic   


Qualifiers: 


   Substance use status: uncomplicated   Qualified Code(s): F10.20 - Alcohol 

dependence, uncomplicated   


Plan to address problem: 


Thiamine, folic acid, multivitamin, CIWA protocol.








(7) DVT prophylaxis


Current Visit: No   Status: Acute   


Plan to address problem: 


SCD to bilateral lower extremities while in bed, patient is ambulatory, 

prophylactic anticoagulation.

## 2020-10-13 NOTE — EMERGENCY DEPARTMENT REPORT
ED Shortness of Breath HPI





- General


Chief Complaint: Adult Asthma


Stated Complaint: SOB/COUGH/CP


Time Seen by Provider: 10/13/20 10:32


Source: patient


Mode of arrival: Ambulatory


Limitations: No Limitations





- History of Present Illness


Initial Comments: 





This is a 40-year-old male nontoxic, well nourished in appearance, with some 

distress presents to the ED with c/o of wheezing, SOB, and chest thightness that

started this morning.  Patient stated has cough with loss of smell x several 

days.  Patient denies any sick contact.  Patient denies any recent travels, long

car, recent hospital stays.  Patient denies any calf pain or calf tenderness.  

Patient denies any fever, chills, nausea, vomiting, hemoptysis, numbness, 

tingling, headache or stiff neck.  Past medical history includes asthma.


MD Complaint: shortness of breath, cough, chest pain, "asthma attack"


-: days(s)


Pain Scale: 3


Consistency: constant


Improves With: nothing


Worsens With: coughing


Known History Of: asthma


Associated Symptoms: chest pain, cough, sputum production


Treatments Prior to Arrival: none





- Related Data


                                  Previous Rx's











 Medication  Instructions  Recorded  Last Taken  Type


 


Amoxicillin/Potassium Clav 1 each PO BID 10 Days #20 tablet 20 Unknown Rx





[Augmentin 875-125 Tablet]    


 


Ibuprofen [Motrin 800 MG tab] 800 mg PO Q8HR PRN #30 tablet 20 Unknown Rx


 


Albuterol Mdi (or & Nicu Only) 2 puff IH QID PRN #8.5 gram 20 Unknown Rx





[ProAir HFA Inhaler]    


 


Cetirizine HCl [Zyrtec 10mg tab] 10 mg PO DAILY #30 tablet 20 Unknown Rx


 


Ibuprofen [Motrin 600 MG tab] 600 mg PO Q8H PRN #30 tablet 20 Unknown Rx


 


Montelukast [Singulair] 10 mg PO QPM #30 tablet 20 Unknown Rx


 


Doxycycline Hyclate 100 mg PO Q12H #20 tablet.dr 20 Unknown Rx


 


methylPREDNISolone [Medrol 4MG 4 mg PO DAILY #1 tab.ds.pk 20 Unknown Rx





DOSEPAK (21 tabs)]    


 


oxyCODONE /ACETAMINOPHEN [Percocet 1 tab PO Q4HR #12 tab 20 Unknown Rx





5/325 mg]    


 


Amoxicillin/Potassium Clav 1 each PO Q12H #20 tablet 20 Unknown Rx





[Augmentin 875-125 Tablet]    


 


Prednisone [predniSONE 10 mg 10 mg PO .TAPER #21 tab.ds.pk 20 Unknown Rx





(6-Day Pack, 21 Tabs)]    


 


Albuterol Mdi (or & Nicu Only) 2 puff IH QID PRN #8.5 gram 20 Unknown Rx





[ProAir HFA Inhaler]    


 


Azithromycin [Zithromax Z-CRISTY] 250 mg PO DAILY #6 tab 20 Unknown Rx


 


Benzonatate [Tessalon Perles] 100 mg PO Q8HR #30 capsule 20 Unknown Rx


 


predniSONE [Deltasone] 40 mg PO QDAY 5 Days #10 tab 20 Unknown Rx


 


Albuterol Mdi (or & Nicu Only) 2 puff IH QID PRN #8.5 gram 20 Unknown Rx





[ProAir HFA Inhaler]    


 


Prednisone [predniSONE 10 mg 10 mg PO .TAPER #1 tab.ds.pk 20 Unknown Rx





(6-Day Pack, 21 Tabs)]    


 


guaiFENesin/CODEINE [Robitussin AC] 5 ml PO Q6H PRN #120 ml 20 Unknown Rx


 


predniSONE [Deltasone] 50 mg PO QDAY #5 tab 20 Unknown Rx


 


Albuterol Mdi (or & Nicu Only) 2 puff IH QID PRN #8.5 gram 20 Unknown Rx





[ProAir HFA Inhaler]    


 


predniSONE [Deltasone] 20 mg PO BID #8 tab 20 Unknown Rx











                                    Allergies











Allergy/AdvReac Type Severity Reaction Status Date / Time


 


No Known Allergies Allergy   Verified 20 15:17














ED Review of Systems


ROS: 


Stated complaint: SOB/COUGH/CP


Other details as noted in HPI





Comment: All other systems reviewed and negative


Constitutional: denies: chills, fever


Eyes: denies: eye pain, eye discharge, vision change


ENT: congestion.  denies: ear pain, throat pain


Respiratory: cough, shortness of breath, wheezing


Cardiovascular: chest pain.  denies: palpitations


Endocrine: no symptoms reported


Gastrointestinal: denies: abdominal pain, nausea, diarrhea


Genitourinary: denies: urgency, dysuria


Musculoskeletal: denies: back pain, joint swelling, arthralgia


Skin: denies: rash, lesions


Neurological: denies: headache, weakness, paresthesias


Psychiatric: denies: anxiety, depression


Hematological/Lymphatic: denies: easy bleeding, easy bruising





ED Past Medical Hx





- Past Medical History


Previous Medical History?: Yes


Hx Asthma: Yes


Additional medical history: gout, Bronchitis, pancreatitis





- Social History


Smoking Status: Current Every Day Smoker


Substance Use Type: None





- Medications


Home Medications: 


                                Home Medications











 Medication  Instructions  Recorded  Confirmed  Last Taken  Type


 


Amoxicillin/Potassium Clav 1 each PO BID 10 Days #20 tablet 20  Unknown Rx





[Augmentin 875-125 Tablet]     


 


Ibuprofen [Motrin 800 MG tab] 800 mg PO Q8HR PRN #30 tablet 20  Unknown Rx


 


Albuterol Mdi (or & Nicu Only) 2 puff IH QID PRN #8.5 gram 20  Unknown Rx





[ProAir HFA Inhaler]     


 


Cetirizine HCl [Zyrtec 10mg tab] 10 mg PO DAILY #30 tablet 20  Unknown Rx


 


Ibuprofen [Motrin 600 MG tab] 600 mg PO Q8H PRN #30 tablet 20  Unknown Rx


 


Montelukast [Singulair] 10 mg PO QPM #30 tablet 20  Unknown Rx


 


Doxycycline Hyclate 100 mg PO Q12H #20 tablet.dr 20  Unknown Rx


 


methylPREDNISolone [Medrol 4MG 4 mg PO DAILY #1 tab.ds.pk 20  Unknown Rx





DOSEPAK (21 tabs)]     


 


oxyCODONE /ACETAMINOPHEN [Percocet 1 tab PO Q4HR #12 tab 20  Unknown Rx





5/325 mg]     


 


Amoxicillin/Potassium Clav 1 each PO Q12H #20 tablet 20  Unknown Rx





[Augmentin 875-125 Tablet]     


 


Prednisone [predniSONE 10 mg 10 mg PO .TAPER #21 tab.ds.pk 20  Unknown Rx





(6-Day Pack, 21 Tabs)]     


 


Albuterol Mdi (or & Nicu Only) 2 puff IH QID PRN #8.5 gram 20  Unknown Rx





[ProAir HFA Inhaler]     


 


Azithromycin [Zithromax Z-CRISTY] 250 mg PO DAILY #6 tab 20  Unknown Rx


 


Benzonatate [Tessalon Perles] 100 mg PO Q8HR #30 capsule 20  Unknown Rx


 


predniSONE [Deltasone] 40 mg PO QDAY 5 Days #10 tab 20  Unknown Rx


 


Albuterol Mdi (or & Nicu Only) 2 puff IH QID PRN #8.5 gram 20  Unknown Rx





[ProAir HFA Inhaler]     


 


Prednisone [predniSONE 10 mg 10 mg PO .TAPER #1 tab.ds.pk 20  Unknown Rx





(6-Day Pack, 21 Tabs)]     


 


guaiFENesin/CODEINE [Robitussin AC] 5 ml PO Q6H PRN #120 ml 20  Unknown Rx


 


predniSONE [Deltasone] 50 mg PO QDAY #5 tab 20  Unknown Rx


 


Albuterol Mdi (or & Nicu Only) 2 puff IH QID PRN #8.5 gram 20  Unknown Rx





[ProAir HFA Inhaler]     


 


predniSONE [Deltasone] 20 mg PO BID #8 tab 20  Unknown Rx














ED Physical Exam





- General


Limitations: No Limitations


General appearance: alert, in no apparent distress





- Head


Head exam: Present: atraumatic, normocephalic





- Eye


Eye exam: Present: normal appearance





- Neck


Neck exam: Present: normal inspection, full ROM.  Absent: tenderness, 

meningismus, lymphadenopathy





- Respiratory


Respiratory exam: Present: respiratory distress, wheezes.  Absent: rales, 

rhonchi, stridor, chest wall tenderness, accessory muscle use, decreased breath 

sounds, prolonged expiratory





- Cardiovascular


Cardiovascular Exam: Present: tachycardia, normal heart sounds.  Absent: 

irregular rhythm, systolic murmur, diastolic murmur, rubs, gallop





- Extremities Exam


Extremities exam: Present: normal inspection, full ROM





- Back Exam


Back exam: Present: normal inspection, full ROM.  Absent: tenderness, CVA 

tenderness (R), CVA tenderness (L), muscle spasm, paraspinal tenderness, 

vertebral tenderness, rash noted





- Neurological Exam


Neurological exam: Present: alert, oriented X3, normal gait





- Psychiatric


Psychiatric exam: Present: normal affect, normal mood





- Skin


Skin exam: Present: warm, dry, intact, normal color.  Absent: rash





ED Course


                                   Vital Signs











  10/13/20 10/13/20 10/13/20





  07:53 08:10 15:12


 


Temperature 98.6 F  


 


Pulse Rate 101 H  


 


Pulse Rate [  100 H 





Bilateral]   


 


Respiratory 22  18





Rate   


 


Respiratory  18 





Rate [Bilateral   





]   


 


Blood Pressure 130/79  





[Right]   


 


O2 Sat by Pulse 91  91





Oximetry   














- Reevaluation(s)


Reevaluation #1: 





10/13/20 12:25


Patient is speaking in full sentences with some distress noted.


Reevaluation #2: 





10/13/20 15:12


Ambulatory sats ranged from 88 to 91%.





- Consultations


Consultation #1: 





10/13/20 15:12


Patient has been consulted with Dr. Milton (hospitalist) about patient history, 

physical exam, and labs/CT results and accepts patient to services.


Consultation #2: 





10/13/20 15:14


Patient consulted with Dr. Read and agrees for admission.





ED Medical Decision Making





- Lab Data


Result diagrams: 


                                 10/13/20 10:47





                                 10/13/20 10:47








                                   Lab Results











  10/13/20 10/13/20 10/13/20 Range/Units





  10:47 10:47 10:47 


 


WBC  14.2 H    (4.5-11.0)  K/mm3


 


RBC  6.05 H    (3.65-5.03)  M/mm3


 


Hgb  12.5    (11.8-15.2)  gm/dl


 


Hct  39.6    (35.5-45.6)  %


 


MCV  65 L    (84-94)  fl


 


MCH  21 L    (28-32)  pg


 


MCHC  32    (32-34)  %


 


RDW  17.3 H    (13.2-15.2)  %


 


Plt Count  247    (140-440)  K/mm3


 


Lymph % (Auto)  15.6    (13.4-35.0)  %


 


Mono % (Auto)  3.7    (0.0-7.3)  %


 


Eos % (Auto)  3.1    (0.0-4.3)  %


 


Baso % (Auto)  0.3    (0.0-1.8)  %


 


Lymph # (Auto)  2.2    (1.2-5.4)  K/mm3


 


Mono # (Auto)  0.5    (0.0-0.8)  K/mm3


 


Eos # (Auto)  0.4    (0.0-0.4)  K/mm3


 


Baso # (Auto)  0.0    (0.0-0.1)  K/mm3


 


Seg Neutrophils %  77.3 H    (40.0-70.0)  %


 


Seg Neutrophils #  11.0 H    (1.8-7.7)  K/mm3


 


PT    15.0 H  (12.2-14.9)  Sec.


 


INR    1.17 H  (0.87-1.13)  


 


APTT    36.5  (24.2-36.6)  Sec.


 


D-Dimer    967.21 H  (0-234)  ng/mlDDU


 


Sodium   138   (137-145)  mmol/L


 


Potassium   4.1   (3.6-5.0)  mmol/L


 


Chloride   101.7   ()  mmol/L


 


BUN   9   (9-20)  mg/dL


 


Creatinine   0.8   (0.8-1.3)  mg/dL


 


Estimated GFR   > 60   ml/min


 


BUN/Creatinine Ratio   11   %


 


Glucose   104 H   ()  mg/dL


 


Calcium   7.7 L   (8.4-10.2)  mg/dL


 


Total Bilirubin   0.20   (0.1-1.2)  mg/dL


 


AST   13   (5-40)  units/L


 


ALT   9   (7-56)  units/L


 


Alkaline Phosphatase   82   ()  units/L


 


Troponin T     (0.00-0.029)  ng/mL


 


Total Protein   5.9 L   (6.3-8.2)  g/dL


 


Albumin   1.3 L   (3.9-5)  g/dL


 


Albumin/Globulin Ratio   0.3   %














  10/13/20 Range/Units





  10:47 


 


WBC   (4.5-11.0)  K/mm3


 


RBC   (3.65-5.03)  M/mm3


 


Hgb   (11.8-15.2)  gm/dl


 


Hct   (35.5-45.6)  %


 


MCV   (84-94)  fl


 


MCH   (28-32)  pg


 


MCHC   (32-34)  %


 


RDW   (13.2-15.2)  %


 


Plt Count   (140-440)  K/mm3


 


Lymph % (Auto)   (13.4-35.0)  %


 


Mono % (Auto)   (0.0-7.3)  %


 


Eos % (Auto)   (0.0-4.3)  %


 


Baso % (Auto)   (0.0-1.8)  %


 


Lymph # (Auto)   (1.2-5.4)  K/mm3


 


Mono # (Auto)   (0.0-0.8)  K/mm3


 


Eos # (Auto)   (0.0-0.4)  K/mm3


 


Baso # (Auto)   (0.0-0.1)  K/mm3


 


Seg Neutrophils %   (40.0-70.0)  %


 


Seg Neutrophils #   (1.8-7.7)  K/mm3


 


PT   (12.2-14.9)  Sec.


 


INR   (0.87-1.13)  


 


APTT   (24.2-36.6)  Sec.


 


D-Dimer   (0-234)  ng/mlDDU


 


Sodium   (137-145)  mmol/L


 


Potassium   (3.6-5.0)  mmol/L


 


Chloride   ()  mmol/L


 


BUN   (9-20)  mg/dL


 


Creatinine   (0.8-1.3)  mg/dL


 


Estimated GFR   ml/min


 


BUN/Creatinine Ratio   %


 


Glucose   ()  mg/dL


 


Calcium   (8.4-10.2)  mg/dL


 


Total Bilirubin   (0.1-1.2)  mg/dL


 


AST   (5-40)  units/L


 


ALT   (7-56)  units/L


 


Alkaline Phosphatase   ()  units/L


 


Troponin T  < 0.010  (0.00-0.029)  ng/mL


 


Total Protein   (6.3-8.2)  g/dL


 


Albumin   (3.9-5)  g/dL


 


Albumin/Globulin Ratio   %














- EKG Data





10/13/20 13:29


Normal sinus rhythm at 81 bpm.


No ST or T wave abnormalities.


Reviewed and signed by MD.





- Radiology Data











Referring Physician: JOHNNIE SILVER


 


Patient Name: YOLY NAIR


 


Patient ID: [id number]


 


YOB: 1980


 


Sex: Male


 


Accession: [id number]


 


Report Date: 2020-10-13


 


Report Status: Finalized








[facility] [address] [place] [address] Cat

 Scan Report Signed Patient: YOLY NAIR MR#: [id number]  : 1980 

Acct:[id number] Age/Sex: 40 / M ADM Date: 10/13/20 Loc: ED Attending Dr: 

Ordering Physician: JOHNNIE SILVER NP Date of Service: 10/13/20 Procedure(s): CT

 angio chest Accession Number(s): [id number] cc: JOHNNIE SILVER NP CTA of the chest

 with 3D Reconstruction Indication: ,SOB Technique: TECHNIQUE: Axial CT images 

were obtained through the chest after injection of 90 cc of Omnipaque 350 IV 

contrast. 3 plane MIP reconstructions were produced. All CT scans at this 

location are performed using CT dose reduction for ALARA by means of automated 

exposure control. COMPARISON: 2020, CT scan Automatic exposure control was 

utilized in an attempt to reduce radiation dose. Findings: Pulmonary arteries: 

The main pulmonary artery and right and left pulmonary artery branches fill 

satisfactorily with contrast. No pulmonary embolus is seen. Lungs: There are 

patchy groundglass opacities noted in the lower lobes,, right middle lobe, in 

the upper lobes including the lingula. This could represent asymmetric edema. 

This could represent an atypical pneumonia including viral pneumonia. 

Mediastinum: There is mild mediastinal adenopathy. Right and left paratracheal 

nodes, left periaortic nodes, hilar nodes and axillary nodes are increased in 

number and are mildly enlarged. The appearance is similar to the prior study. 

Aorta: Normal in diameter. No dissection seen within limits of this exam. No 

acute abnormality is seen in the upper abdomen. Impression: 1. No pulmonary 

embolus is seen 2. There are patchy groundglass opacities noted in both lungs 

which could represent asymmetric edema. Possibility of an atypical pneumonia is 

included in the differential diagnosis 3. Mild nonspecific adenopathy appears 

unchanged from the prior study. The etiology is not determined by this exam. 

Signer Name: Wing Nevarez MD Signed: 10/13/2020 2:12 PM Workstation Name: 

[id number] Transcribed By: SS Dictated By: Wing Nevarez MD Electronically

 Authenticated By: Wing Nevarez MD Signed Date/Time: 10/13/20 1412 DD/DT: 

10/13/20 1406 TD/TT: 











Referring Physician: ED DOC


 


Patient Name: YOLY NAIR


 


Patient ID: [id number]


 


YOB: 1980


 


Sex: Male


 


Accession: [id number]


 


Report Date: 2020-10-13


 


Report Status: Finalized








[facility] [address] [place] [address] 

XRay Report Signed Patient: YOLY NAIR MR#: [id number]  : 1980 

Acct:[id number] Age/Sex: 40 / M ADM Date: 10/13/20 Loc: ED Attending Dr: 

Ordering Physician: BROWN ROBBINS MD Date of Service: 10/13/20 Procedure(s): XR chest 

routine 2V Accession Number(s): [id number] cc: BROWN ROBBINS MD Fluoro Time In Minutes: 

XR chest routine 2V INDICATION / CLINICAL INFORMATION: SOB, cough, asthma hx 

COMPARISON: 2020 FINDINGS: SUPPORT DEVICES: None. HEART / MEDIASTINUM: 

No significant abnormality. LUNGS / PLEURA: Lungs are clear. Costophrenic sulci 

are sharp. No pneumothorax. ADDITIONAL FINDINGS: No significant additional 

findings. IMPRESSION: 1. No acute findings. Signer Name: Octavio Krueger MD Signed: 

10/13/2020 8:57 AM Workstation Name: [id number] Transcribed By: COLEEN Dictated By:

 Octavio Krueger MD Electronically Authenticated By: Octavio Krueger MD Signed

 Date/Time: 10/13/20 0857 DD/DT: 10/13/20 0857 TD/TT: 





- Medical Decision Making





This is a 40-year-old male that presents with suspected COVID, hypoxia and whe

ezing.  Patient is stable and was examined by me.  Patient admitted with 

hospitalist Dr. Milton.  Despite giving treatment in the ER patient sats are 

still 88 to 91%.  Patient is still complaining of shortness of breath.  Patient 

is notified of the CT and labs with no questions noted by the patient.  At time 

of admission, the patient does not seem toxic or ill in appearance.  No acute 

signs of distress noted.  Patient agrees to admission treatment plan of care.  

No further questions noted by the patient.





- Differential Diagnosis


PE, asthma exacerbation, COVID, bronchitis, pneumonia


Critical care attestation.: 


If time is entered above; I have spent that time in minutes in the direct care 

of this critically ill patient, excluding procedure time.








ED Disposition


Clinical Impression: 


 Hypoxia, Suspected COVID-19 virus infection





PNA (pneumonia)


Qualifiers:


 Pneumonia type: due to unspecified organism Laterality: bilateral Lung 

location: unspecified part of lung Qualified Code(s): J18.9 - Pneumonia, 

unspecified organism





Disposition:  OP ADMIT IP TO THIS HOSP


Is pt being admited?: Yes


Condition: Stable

## 2020-10-13 NOTE — CAT SCAN REPORT
CTA of the chest with 3D Reconstruction







Indication:

,SOB



Technique: 

TECHNIQUE: Axial CT images were obtained through the chest after injection of 90 cc of Omnipaque 350 
IV contrast. 3 plane MIP reconstructions were produced. All CT scans at this location are performed u
[facility] CT dose reduction for ZOYA by means of automated exposure control.



COMPARISON:

7/27/2020, CT scan







 Automatic exposure control was utilized in an attempt to reduce radiation dose.



Findings:



Pulmonary arteries: The main pulmonary artery and right and left pulmonary artery branches fill satis
factorily with contrast. No pulmonary embolus is seen.

Lungs: There are patchy groundglass opacities noted in the lower lobes,, right middle lobe, in the up
per lobes including the lingula. This could represent asymmetric edema. This could represent an atypi
alex pneumonia including viral pneumonia.

Mediastinum: There is mild mediastinal adenopathy. Right and left paratracheal nodes, left periaortic
 nodes, hilar nodes and axillary nodes are increased in number and are mildly enlarged. The appearanc
e is similar to the prior study.

Aorta: Normal in diameter.  No dissection seen within limits of this exam.



 No acute abnormality is seen in the upper abdomen.





Impression:



1. No pulmonary embolus is seen

2. There are patchy groundglass opacities noted in both lungs which could represent asymmetric edema.
 Possibility of an atypical pneumonia is included in the differential diagnosis

3. Mild nonspecific adenopathy appears unchanged from the prior study. The etiology is not determined
 by this exam.



Signer Name: Wing Nevarez MD 

Signed: 10/13/2020 2:12 PM

Workstation Name: [id number]

## 2020-10-14 LAB
BASOPHILS # (AUTO): 0 K/MM3 (ref 0–0.1)
BASOPHILS NFR BLD AUTO: 0.2 % (ref 0–1.8)
BUN SERPL-MCNC: 21 MG/DL (ref 9–20)
BUN/CREAT SERPL: 18 %
CALCIUM SERPL-MCNC: 7.7 MG/DL (ref 8.4–10.2)
EOSINOPHIL # BLD AUTO: 0 K/MM3 (ref 0–0.4)
EOSINOPHIL NFR BLD AUTO: 0.1 % (ref 0–4.3)
HCT VFR BLD CALC: 37.3 % (ref 35.5–45.6)
HEMOLYSIS INDEX: 7
HGB BLD-MCNC: 12 GM/DL (ref 11.8–15.2)
LYMPHOCYTES # BLD AUTO: 1.7 K/MM3 (ref 1.2–5.4)
LYMPHOCYTES NFR BLD AUTO: 16.5 % (ref 13.4–35)
MCHC RBC AUTO-ENTMCNC: 32 % (ref 32–34)
MCV RBC AUTO: 64 FL (ref 84–94)
MONOCYTES # (AUTO): 0.2 K/MM3 (ref 0–0.8)
MONOCYTES % (AUTO): 1.9 % (ref 0–7.3)
PLATELET # BLD: 255 K/MM3 (ref 140–440)
RBC # BLD AUTO: 5.81 M/MM3 (ref 3.65–5.03)

## 2020-10-14 RX ADMIN — Medication SCH ML: at 12:03

## 2020-10-14 RX ADMIN — METHYLPREDNISOLONE SODIUM SUCCINATE SCH MG: 40 INJECTION, POWDER, FOR SOLUTION INTRAMUSCULAR; INTRAVENOUS at 18:44

## 2020-10-14 RX ADMIN — FOLIC ACID SCH MG: 1 TABLET ORAL at 15:05

## 2020-10-14 RX ADMIN — HEPARIN SODIUM SCH: 5000 INJECTION, SOLUTION INTRAVENOUS; SUBCUTANEOUS at 12:18

## 2020-10-14 RX ADMIN — Medication SCH MG: at 12:04

## 2020-10-14 RX ADMIN — METHYLPREDNISOLONE SODIUM SUCCINATE SCH MG: 40 INJECTION, POWDER, FOR SOLUTION INTRAMUSCULAR; INTRAVENOUS at 12:03

## 2020-10-14 RX ADMIN — CEFTRIAXONE SODIUM SCH MLS/HR: 2 INJECTION, POWDER, FOR SOLUTION INTRAMUSCULAR; INTRAVENOUS at 14:58

## 2020-10-14 RX ADMIN — MULTIVITAMIN TABLET SCH EACH: TABLET at 12:04

## 2020-10-14 RX ADMIN — HEPARIN SODIUM SCH UNIT: 5000 INJECTION, SOLUTION INTRAVENOUS; SUBCUTANEOUS at 22:17

## 2020-10-14 RX ADMIN — HEPARIN SODIUM SCH UNIT: 5000 INJECTION, SOLUTION INTRAVENOUS; SUBCUTANEOUS at 12:04

## 2020-10-14 RX ADMIN — CEFTRIAXONE SODIUM SCH MLS/HR: 2 INJECTION, POWDER, FOR SOLUTION INTRAMUSCULAR; INTRAVENOUS at 12:03

## 2020-10-14 NOTE — PROGRESS NOTE
Assessment and Plan


Assessment and plan: 





Acute hypoxemic respiratory failure


Chest x-ray, CTA chest, d-dimer, arterial blood gas, supplemental oxygen, pulse 

oximetry, nebulizer therapy via MDI via spacer, prone positioning while in bed





PNA (pneumonia)


Pneumonia protocol: Chest x-ray, CTA chest, supplemental oxygen, pulse oximetry,

IV antibiotic therapy, blood culture,





Suspected COVID-19 virus infection


Coronavirus protocol initiated in the emergency department: Coronavirus PCR 

ordered and is pending at the time of admission, isolation precaution, contact 

precaution, prone positioning while in bed, IV steroid therapy,





Systemic inflammatory response syndrome


CBC, CMP, chest x-ray, urinalysis, blood culture, IV antibiotic therapy.





Nicotine dependence


Smoking cessation, supportive care, behavior change counseling, +15 minutes.





EtOH dependence


Thiamine, folic acid, multivitamin, CIWA protocol.





DVT prophylaxis


SCD to bilateral lower extremities while in bed, patient is ambulatory, 

prophylactic anticoagulation.











History


Interval history: 





No new issues overnight.





Hospitalist Physical





- Constitutional


Vitals: 


                                        











Temp Pulse Resp BP Pulse Ox


 


 97.8 F   64   20   146/78   96 


 


 10/14/20 05:48  10/14/20 05:48  10/14/20 05:48  10/14/20 05:48  10/14/20 05:48











General appearance: Present: mild distress





- EENT


Eyes: Present: PERRL, EOM intact


ENT: hearing intact, clear oral mucosa, dentition normal





- Neck


Neck: Present: supple, normal ROM





- Respiratory


Respiratory effort: normal


Respiratory: bilateral: CTA





- Cardiovascular


Rhythm: regular


Heart Sounds: Present: S1 & S2.  Absent: gallop, rub





- Extremities


Extremities: no ischemia, No edema, Full ROM





- Abdominal


General gastrointestinal: soft, non-tender, non-distended, normal bowel sounds





- Integumentary


Integumentary: Present: clear, warm, dry





- Neurologic


Neurologic: CNII-XII intact, moves all extremities





HEART Score





- HEART Score


Troponin: 


                                        











Troponin T  < 0.010 ng/mL (0.00-0.029)   10/13/20  10:47    














Results





- Labs


CBC & Chem 7: 


                                 10/14/20 07:08





                                 10/14/20 07:08


Labs: 


                             Laboratory Last Values











WBC  10.2 K/mm3 (4.5-11.0)   10/14/20  07:08    


 


RBC  5.81 M/mm3 (3.65-5.03)  H  10/14/20  07:08    


 


Hgb  12.0 gm/dl (11.8-15.2)   10/14/20  07:08    


 


Hct  37.3 % (35.5-45.6)   10/14/20  07:08    


 


MCV  64 fl (84-94)  L  10/14/20  07:08    


 


MCH  21 pg (28-32)  L  10/14/20  07:08    


 


MCHC  32 % (32-34)   10/14/20  07:08    


 


RDW  17.2 % (13.2-15.2)  H  10/14/20  07:08    


 


Plt Count  255 K/mm3 (140-440)   10/14/20  07:08    


 


Lymph % (Auto)  16.5 % (13.4-35.0)   10/14/20  07:08    


 


Mono % (Auto)  1.9 % (0.0-7.3)   10/14/20  07:08    


 


Eos % (Auto)  0.1 % (0.0-4.3)   10/14/20  07:08    


 


Baso % (Auto)  0.2 % (0.0-1.8)   10/14/20  07:08    


 


Lymph # (Auto)  1.7 K/mm3 (1.2-5.4)   10/14/20  07:08    


 


Mono # (Auto)  0.2 K/mm3 (0.0-0.8)   10/14/20  07:08    


 


Eos # (Auto)  0.0 K/mm3 (0.0-0.4)   10/14/20  07:08    


 


Baso # (Auto)  0.0 K/mm3 (0.0-0.1)   10/14/20  07:08    


 


Seg Neutrophils %  81.3 % (40.0-70.0)  H  10/14/20  07:08    


 


Seg Neutrophils #  8.3 K/mm3 (1.8-7.7)  H  10/14/20  07:08    


 


PT  15.0 Sec. (12.2-14.9)  H  10/13/20  10:47    


 


INR  1.17  (0.87-1.13)  H  10/13/20  10:47    


 


APTT  36.5 Sec. (24.2-36.6)   10/13/20  10:47    


 


D-Dimer  819.77 ng/mlDDU (0-234)  H  10/13/20  15:23    


 


ABG pH  7.438 pH Units (7.350-7.450)   10/13/20  14:05    


 


ABG pCO2  38.3 mm Hg  10/13/20  14:05    


 


ABG pO2  77.1 mm Hg (80.0-90.0)  L  10/13/20  14:05    


 


ABG HCO3  25.3 mmol/L (20.0-26.0)   10/13/20  14:05    


 


ABG O2 Saturation  96.0 % (95.0-99.0)   10/13/20  14:05    


 


ABG O2 Content  19.1  (0.0-44)   10/13/20  14:05    


 


ABG Base Excess  1.2 mmol/L (-2.0-3.0)   10/13/20  14:05    


 


ABG Hemoglobin  14.4 gm/dl (14.0-18.0)   10/13/20  14:05    


 


ABG Carboxyhemoglobin  1.6 % (0.0-5.0)   10/13/20  14:05    


 


ABG Methemoglobin  0.6 % (0.0-1.5)   10/13/20  14:05    


 


Oxyhemoglobin  93.8 % (95.0-99.0)  L  10/13/20  14:05    


 


FiO2  21 %  10/13/20  14:05    


 


Sodium  137 mmol/L (137-145)   10/14/20  07:08    


 


Potassium  4.9 mmol/L (3.6-5.0)   10/14/20  07:08    


 


Chloride  100.6 mmol/L ()   10/14/20  07:08    


 


Carbon Dioxide  25 mmol/L (22-30)   10/14/20  07:08    


 


Anion Gap  16 mmol/L  10/14/20  07:08    


 


BUN  21 mg/dL (9-20)  H  10/14/20  07:08    


 


Creatinine  1.2 mg/dL (0.8-1.3)   10/14/20  07:08    


 


Estimated GFR  > 60 ml/min  10/14/20  07:08    


 


BUN/Creatinine Ratio  18 %  10/14/20  07:08    


 


Glucose  140 mg/dL ()  H  10/14/20  07:08    


 


Calcium  7.7 mg/dL (8.4-10.2)  L  10/14/20  07:08    


 


Ferritin  267.3 ng/mL (30.0-300.0)   10/13/20  15:23    


 


Total Bilirubin  0.20 mg/dL (0.1-1.2)   10/13/20  10:47    


 


AST  13 units/L (5-40)   10/13/20  10:47    


 


ALT  9 units/L (7-56)   10/13/20  10:47    


 


Alkaline Phosphatase  82 units/L ()   10/13/20  10:47    


 


Lactate Dehydrogenase  248 units/L ()  H  10/13/20  15:23    


 


Troponin T  < 0.010 ng/mL (0.00-0.029)   10/13/20  10:47    


 


C-Reactive Protein  7.40 mg/dL (0.00-1.30)  H  10/13/20  15:23    


 


Total Protein  5.9 g/dL (6.3-8.2)  L  10/13/20  10:47    


 


Albumin  1.3 g/dL (3.9-5)  L  10/13/20  10:47    


 


Albumin/Globulin Ratio  0.3 %  10/13/20  10:47    











Bai/IV: 


                                        





Voiding Method                   Toilet


IV Catheter Type [Right          Peripheral IV


Antecubital]                     











Active Medications





- Current Medications


Current Medications: 














Generic Name Dose Route Start Last Admin





  Trade Name Freq  PRN Reason Stop Dose Admin


 


Acetaminophen  650 mg  10/13/20 15:15 





  Tylenol  PO  





  Q4H PRN  





  Pain MILD(1-3)/Fever >100.5/HA  


 


Azithromycin  500 mg  10/15/20 10:00 





  Zithromax  PO  10/17/20 10:01 





  QDAY ALEIDA  


 


Folic Acid  1 mg  10/13/20 20:05  10/13/20 20:39





  Folvite  PO   1 mg





  QDAY ALEIDA   Administration


 


Heparin Sodium (Porcine)  5,000 unit  10/13/20 22:00  10/13/20 21:50





  Heparin  SUB-Q   5,000 unit





  Q12HR ALEIDA   Administration


 


Ceftriaxone Sodium  2 gm in 100 mls @ 200 mls/hr  10/14/20 10:00 





  Rocephin/Ns 2 Gm/100 Ml  IV  





  Q24HR ALEIDA  





  Protocol  


 


Azithromycin 500 mg/ Sodium  250 mls @ 250 mls/hr  10/14/20 10:00 





  Chloride  IV  10/14/20 16:00 





  Q24HR ALEIDA  





  Protocol  


 


Methylprednisolone Sodium Succinate  40 mg  10/13/20 16:00  10/13/20 23:53





  Solu-Medrol  IV   40 mg





  Q8H ALEIDA   Administration


 


Multivitamins  1 each  10/13/20 20:05  10/13/20 20:39





  Theragran Tab  PO   1 each





  QDAY ALEIDA   Administration


 


Ondansetron HCl  4 mg  10/13/20 15:15 





  Zofran  IV  





  Q8H PRN  





  Nausea And Vomiting  


 


Sodium Chloride  10 ml  10/13/20 22:00  10/13/20 21:52





  Sodium Chloride Flush Syringe 10 Ml  IV   10 ml





  BID ALEIDA   Administration


 


Sodium Chloride  10 ml  10/13/20 15:15 





  Sodium Chloride Flush Syringe 10 Ml  IV  





  PRN PRN  





  LINE FLUSH  


 


Thiamine HCl  100 mg  10/13/20 20:05  10/13/20 20:39





  Vitamin B-1  PO   100 mg





  QDAY ALEIDA   Administration

## 2020-10-15 RX ADMIN — METHYLPREDNISOLONE SODIUM SUCCINATE SCH MG: 40 INJECTION, POWDER, FOR SOLUTION INTRAMUSCULAR; INTRAVENOUS at 00:55

## 2020-10-15 RX ADMIN — MULTIVITAMIN TABLET SCH EACH: TABLET at 10:53

## 2020-10-15 RX ADMIN — Medication SCH ML: at 00:55

## 2020-10-15 RX ADMIN — CEFTRIAXONE SODIUM SCH MLS/HR: 2 INJECTION, POWDER, FOR SOLUTION INTRAMUSCULAR; INTRAVENOUS at 10:52

## 2020-10-15 RX ADMIN — AZITHROMYCIN SCH MG: 250 TABLET, FILM COATED ORAL at 10:53

## 2020-10-15 RX ADMIN — HEPARIN SODIUM SCH UNIT: 5000 INJECTION, SOLUTION INTRAVENOUS; SUBCUTANEOUS at 10:52

## 2020-10-15 RX ADMIN — HEPARIN SODIUM SCH UNIT: 5000 INJECTION, SOLUTION INTRAVENOUS; SUBCUTANEOUS at 21:48

## 2020-10-15 RX ADMIN — METHYLPREDNISOLONE SODIUM SUCCINATE SCH MG: 40 INJECTION, POWDER, FOR SOLUTION INTRAMUSCULAR; INTRAVENOUS at 10:53

## 2020-10-15 RX ADMIN — Medication SCH ML: at 10:52

## 2020-10-15 RX ADMIN — Medication SCH MG: at 10:53

## 2020-10-15 RX ADMIN — METHYLPREDNISOLONE SODIUM SUCCINATE SCH MG: 40 INJECTION, POWDER, FOR SOLUTION INTRAMUSCULAR; INTRAVENOUS at 16:39

## 2020-10-15 RX ADMIN — BUDESONIDE SCH MG: 0.5 INHALANT RESPIRATORY (INHALATION) at 20:43

## 2020-10-15 RX ADMIN — FOLIC ACID SCH MG: 1 TABLET ORAL at 10:53

## 2020-10-15 RX ADMIN — Medication SCH ML: at 21:49

## 2020-10-15 RX ADMIN — ARFORMOTEROL TARTRATE SCH MCG: 15 SOLUTION RESPIRATORY (INHALATION) at 20:43

## 2020-10-15 NOTE — PROGRESS NOTE
Assessment and Plan


Assessment and plan: 





Acute hypoxemic respiratory failure


Chest x-ray, CTA chest, d-dimer, arterial blood gas, supplemental oxygen, pulse 

oximetry, nebulizer therapy via MDI via spacer, prone positioning while in bed





PNA (pneumonia)


Pneumonia protocol: Chest x-ray, CTA chest, supplemental oxygen, pulse oximetry,

IV antibiotic therapy, blood culture,





Acute asthma exacerbation 





Sepsis


Follow-up blood and urine cultures.  Sepsis protocol.  Present on admission.  

Patient meets criteria given the tachycardia leukocytosis and diagnosis of 

pneumonia





Nicotine dependence


Smoking cessation, supportive care, behavior change counseling, +15 minutes.





EtOH dependence


Thiamine, folic acid, multivitamin, CIWA protocol.





DVT prophylaxis


SCD to bilateral lower extremities while in bed, patient is ambulatory, 

prophylactic anticoagulation.





10/15/2020.  COVID-19 testing was found to be negative.  Continue IV antibiotics

and monitor closely patient reports of history of asthma and likely has 

component of asthma exacerbation.  Continue steroids and bronchodilators











History


Interval history: 





No new issues overnight.





Hospitalist Physical





- Constitutional


Vitals: 


                                        











Temp Pulse Resp BP Pulse Ox


 


 97.5 F L  69   20   145/77   92 


 


 10/15/20 04:46  10/15/20 04:46  10/15/20 04:46  10/15/20 04:46  10/15/20 04:46











General appearance: Present: mild distress





- EENT


Eyes: Present: PERRL, EOM intact


ENT: hearing intact, clear oral mucosa, dentition normal





- Neck


Neck: Present: supple, normal ROM





- Respiratory


Respiratory effort: normal


Respiratory: bilateral: CTA





- Cardiovascular


Rhythm: regular


Heart Sounds: Present: S1 & S2.  Absent: gallop, rub





- Extremities


Extremities: no ischemia, No edema, Full ROM





- Abdominal


General gastrointestinal: soft, non-tender, non-distended, normal bowel sounds





- Integumentary


Integumentary: Present: clear, warm, dry





- Neurologic


Neurologic: CNII-XII intact, moves all extremities





HEART Score





- HEART Score


Troponin: 


                                        











Troponin T  < 0.010 ng/mL (0.00-0.029)   10/13/20  10:47    














Results





- Labs


CBC & Chem 7: 


                                 10/14/20 07:08





                                 10/14/20 07:08


Labs: 


                             Laboratory Last Values











WBC  10.2 K/mm3 (4.5-11.0)   10/14/20  07:08    


 


RBC  5.81 M/mm3 (3.65-5.03)  H  10/14/20  07:08    


 


Hgb  12.0 gm/dl (11.8-15.2)   10/14/20  07:08    


 


Hct  37.3 % (35.5-45.6)   10/14/20  07:08    


 


MCV  64 fl (84-94)  L  10/14/20  07:08    


 


MCH  21 pg (28-32)  L  10/14/20  07:08    


 


MCHC  32 % (32-34)   10/14/20  07:08    


 


RDW  17.2 % (13.2-15.2)  H  10/14/20  07:08    


 


Plt Count  255 K/mm3 (140-440)   10/14/20  07:08    


 


Lymph % (Auto)  16.5 % (13.4-35.0)   10/14/20  07:08    


 


Mono % (Auto)  1.9 % (0.0-7.3)   10/14/20  07:08    


 


Eos % (Auto)  0.1 % (0.0-4.3)   10/14/20  07:08    


 


Baso % (Auto)  0.2 % (0.0-1.8)   10/14/20  07:08    


 


Lymph # (Auto)  1.7 K/mm3 (1.2-5.4)   10/14/20  07:08    


 


Mono # (Auto)  0.2 K/mm3 (0.0-0.8)   10/14/20  07:08    


 


Eos # (Auto)  0.0 K/mm3 (0.0-0.4)   10/14/20  07:08    


 


Baso # (Auto)  0.0 K/mm3 (0.0-0.1)   10/14/20  07:08    


 


Seg Neutrophils %  81.3 % (40.0-70.0)  H  10/14/20  07:08    


 


Seg Neutrophils #  8.3 K/mm3 (1.8-7.7)  H  10/14/20  07:08    


 


PT  15.0 Sec. (12.2-14.9)  H  10/13/20  10:47    


 


INR  1.17  (0.87-1.13)  H  10/13/20  10:47    


 


APTT  36.5 Sec. (24.2-36.6)   10/13/20  10:47    


 


D-Dimer  819.77 ng/mlDDU (0-234)  H  10/13/20  15:23    


 


ABG pH  7.438 pH Units (7.350-7.450)   10/13/20  14:05    


 


ABG pCO2  38.3 mm Hg  10/13/20  14:05    


 


ABG pO2  77.1 mm Hg (80.0-90.0)  L  10/13/20  14:05    


 


ABG HCO3  25.3 mmol/L (20.0-26.0)   10/13/20  14:05    


 


ABG O2 Saturation  96.0 % (95.0-99.0)   10/13/20  14:05    


 


ABG O2 Content  19.1  (0.0-44)   10/13/20  14:05    


 


ABG Base Excess  1.2 mmol/L (-2.0-3.0)   10/13/20  14:05    


 


ABG Hemoglobin  14.4 gm/dl (14.0-18.0)   10/13/20  14:05    


 


ABG Carboxyhemoglobin  1.6 % (0.0-5.0)   10/13/20  14:05    


 


ABG Methemoglobin  0.6 % (0.0-1.5)   10/13/20  14:05    


 


Oxyhemoglobin  93.8 % (95.0-99.0)  L  10/13/20  14:05    


 


FiO2  21 %  10/13/20  14:05    


 


Sodium  137 mmol/L (137-145)   10/14/20  07:08    


 


Potassium  4.9 mmol/L (3.6-5.0)   10/14/20  07:08    


 


Chloride  100.6 mmol/L ()   10/14/20  07:08    


 


Carbon Dioxide  25 mmol/L (22-30)   10/14/20  07:08    


 


Anion Gap  16 mmol/L  10/14/20  07:08    


 


BUN  21 mg/dL (9-20)  H  10/14/20  07:08    


 


Creatinine  1.2 mg/dL (0.8-1.3)   10/14/20  07:08    


 


Estimated GFR  > 60 ml/min  10/14/20  07:08    


 


BUN/Creatinine Ratio  18 %  10/14/20  07:08    


 


Glucose  140 mg/dL ()  H  10/14/20  07:08    


 


Calcium  7.7 mg/dL (8.4-10.2)  L  10/14/20  07:08    


 


Ferritin  267.3 ng/mL (30.0-300.0)   10/13/20  15:23    


 


Total Bilirubin  0.20 mg/dL (0.1-1.2)   10/13/20  10:47    


 


AST  13 units/L (5-40)   10/13/20  10:47    


 


ALT  9 units/L (7-56)   10/13/20  10:47    


 


Alkaline Phosphatase  82 units/L ()   10/13/20  10:47    


 


Lactate Dehydrogenase  248 units/L ()  H  10/13/20  15:23    


 


Troponin T  < 0.010 ng/mL (0.00-0.029)   10/13/20  10:47    


 


C-Reactive Protein  7.40 mg/dL (0.00-1.30)  H  10/13/20  15:23    


 


Total Protein  5.9 g/dL (6.3-8.2)  L  10/13/20  10:47    


 


Albumin  1.3 g/dL (3.9-5)  L  10/13/20  10:47    


 


Albumin/Globulin Ratio  0.3 %  10/13/20  10:47    


 


Procalcitonin  < 0.05 ng/mL (<0.15)   10/13/20  15:23    


 


Coronavirus (PCR)  Negative  (Negative)   10/14/20  14:40    











Bai/IV: 


                                        





Voiding Method                   Toilet


IV Catheter Type [Left Forearm   INT / Saline Lock


]                                


IV Catheter Type [Right          Peripheral IV


Antecubital]                     











Active Medications





- Current Medications


Current Medications: 














Generic Name Dose Route Start Last Admin





  Trade Name Freq  PRN Reason Stop Dose Admin


 


Acetaminophen  650 mg  10/13/20 15:15 





  Tylenol  PO  





  Q4H PRN  





  Pain MILD(1-3)/Fever >100.5/HA  


 


Azithromycin  500 mg  10/15/20 10:00 





  Zithromax  PO  10/17/20 10:01 





  QDAY ALEIDA  


 


Folic Acid  1 mg  10/13/20 20:05  10/14/20 15:05





  Folvite  PO   1 mg





  QDAY ALEIDA   Administration


 


Guaifenesin  200 mg  10/14/20 21:57  10/14/20 22:17





  Robitussin  PO   200 mg





  Q6H PRN   Administration





  Cough  


 


Heparin Sodium (Porcine)  5,000 unit  10/13/20 22:00  10/14/20 22:17





  Heparin  SUB-Q   5,000 unit





  Q12HR ALEIDA   Administration


 


Ceftriaxone Sodium  2 gm in 100 mls @ 200 mls/hr  10/14/20 10:00  10/14/20 14:58





  Rocephin/Ns 2 Gm/100 Ml  IV   200 mls/hr





  Q24HR ALEIDA   Administration





  Protocol  


 


Methylprednisolone Sodium Succinate  40 mg  10/13/20 16:00  10/15/20 00:55





  Solu-Medrol  IV   40 mg





  Q8H ALEIDA   Administration


 


Multivitamins  1 each  10/13/20 20:05  10/14/20 12:04





  Theragran Tab  PO   1 each





  QDAY ALEIDA   Administration


 


Ondansetron HCl  4 mg  10/13/20 15:15 





  Zofran  IV  





  Q8H PRN  





  Nausea And Vomiting  


 


Sodium Chloride  10 ml  10/13/20 22:00  10/15/20 00:55





  Sodium Chloride Flush Syringe 10 Ml  IV   10 ml





  BID ALEIDA   Administration


 


Sodium Chloride  10 ml  10/13/20 15:15 





  Sodium Chloride Flush Syringe 10 Ml  IV  





  PRN PRN  





  LINE FLUSH  


 


Thiamine HCl  100 mg  10/13/20 20:05  10/14/20 12:04





  Vitamin B-1  PO   100 mg





  QDAY ALEIDA   Administration

## 2020-10-16 VITALS — DIASTOLIC BLOOD PRESSURE: 84 MMHG | SYSTOLIC BLOOD PRESSURE: 143 MMHG

## 2020-10-16 LAB
BASOPHILS # (AUTO): 0 K/MM3 (ref 0–0.1)
BASOPHILS NFR BLD AUTO: 0 % (ref 0–1.8)
BUN SERPL-MCNC: 24 MG/DL (ref 9–20)
BUN/CREAT SERPL: 27 %
CALCIUM SERPL-MCNC: 7.8 MG/DL (ref 8.4–10.2)
EOSINOPHIL # BLD AUTO: 0 K/MM3 (ref 0–0.4)
EOSINOPHIL NFR BLD AUTO: 0 % (ref 0–4.3)
HCT VFR BLD CALC: 36.6 % (ref 35.5–45.6)
HEMOLYSIS INDEX: 5
HGB BLD-MCNC: 11.7 GM/DL (ref 11.8–15.2)
LYMPHOCYTES # BLD AUTO: 2.4 K/MM3 (ref 1.2–5.4)
LYMPHOCYTES NFR BLD AUTO: 21.1 % (ref 13.4–35)
MCHC RBC AUTO-ENTMCNC: 32 % (ref 32–34)
MCV RBC AUTO: 64 FL (ref 84–94)
MONOCYTES # (AUTO): 0.2 K/MM3 (ref 0–0.8)
MONOCYTES % (AUTO): 2.1 % (ref 0–7.3)
PLATELET # BLD: 280 K/MM3 (ref 140–440)
RBC # BLD AUTO: 5.7 M/MM3 (ref 3.65–5.03)

## 2020-10-16 RX ADMIN — HEPARIN SODIUM SCH UNIT: 5000 INJECTION, SOLUTION INTRAVENOUS; SUBCUTANEOUS at 10:16

## 2020-10-16 RX ADMIN — Medication SCH ML: at 10:02

## 2020-10-16 RX ADMIN — METHYLPREDNISOLONE SODIUM SUCCINATE SCH MG: 40 INJECTION, POWDER, FOR SOLUTION INTRAMUSCULAR; INTRAVENOUS at 00:40

## 2020-10-16 RX ADMIN — BUDESONIDE SCH MG: 0.5 INHALANT RESPIRATORY (INHALATION) at 07:44

## 2020-10-16 RX ADMIN — CEFTRIAXONE SODIUM SCH MLS/HR: 2 INJECTION, POWDER, FOR SOLUTION INTRAMUSCULAR; INTRAVENOUS at 10:01

## 2020-10-16 RX ADMIN — AZITHROMYCIN SCH MG: 250 TABLET, FILM COATED ORAL at 10:02

## 2020-10-16 RX ADMIN — MULTIVITAMIN TABLET SCH EACH: TABLET at 10:02

## 2020-10-16 RX ADMIN — Medication SCH MG: at 10:02

## 2020-10-16 RX ADMIN — METHYLPREDNISOLONE SODIUM SUCCINATE SCH MG: 40 INJECTION, POWDER, FOR SOLUTION INTRAMUSCULAR; INTRAVENOUS at 10:01

## 2020-10-16 RX ADMIN — ARFORMOTEROL TARTRATE SCH MCG: 15 SOLUTION RESPIRATORY (INHALATION) at 07:44

## 2020-10-16 RX ADMIN — FOLIC ACID SCH MG: 1 TABLET ORAL at 10:02

## 2020-10-16 NOTE — DISCHARGE SUMMARY
Providers





- Providers


Date of Admission: 


10/13/20 15:15





Date of discharge: 10/16/20


Attending physician: 


OSBALDO MARES





Primary care physician: 


PRIMARY CARE MD








Hospitalization


Reason for admission: PNA, asthma exac


Condition: Stable


Hospital course: 





41 YO Male with Mild Intermittent Asthma, Nicotine Dependence, ETOH Dependence 

complicated by Pancreatitis presented to ED for evaluation. Pt stated that he 

has experienced shortness of breath over the past 5 days with persistently 

worsening symptoms over the same timeframe.  Patient knowledges decreased 

exercise tolerance, dyspnea on exertion as well as dyspnea at rest.  Pt seen and

evaluated in the emergency department.  Patient underwent CT scan of the chest 

and was found to have bilateral pneumonia.  Patient also found to have a pulse 

oximetry of 86% with exertion which is consistent with acute hypoxemic 

respiratory failure.  Pt. admitted with dx of acute hypoxic resp failure, asthma

exac, Srinivasa pna and sepsis.  Patient initiated on pneumonia protocol as well as 

initiated on COVID-19 protocol.  Patient treated with IV antibiotic therapy as 

well as IV steroid therapy and admitted to medical floor due to increased risk 

of decompensation.  Covid testing found to be negative.  Pt. received rx with 

abx, steroids and breathing tmts with resolution.  Dedicated d/c time 34 min


Disposition: [id number] TO HOME OR SELFCARE


Time spent for discharge: 34





- Discharge Diagnoses


(1) Sepsis


Status: Acute   





(2) Acute hypoxemic respiratory failure


Status: Acute   





(3) Hypoxia


Status: Acute   





(4) Nicotine dependence


Status: Acute   


Qualifiers: 


   Nicotine product type: cigarettes   Substance use status: in withdrawal   

Qualified Code(s): F17.213 - Nicotine dependence, cigarettes, with withdrawal   





(5) PNA (pneumonia)


Status: Acute   


Qualifiers: 


   Pneumonia type: due to unspecified organism   Laterality: bilateral   Lung 

location: unspecified part of lung   Qualified Code(s): J18.9 - Pneumonia, 

unspecified organism   





(6) Asthma


Status: Chronic   





Core Measure Documentation





- Palliative Care


Palliative Care/ Comfort Measures: Not Applicable





- Core Measures


Any of the following diagnoses?: none





Exam





- Constitutional


Vitals: 


                                        











Temp Pulse Resp BP Pulse Ox


 


 97.4 F L  74   16   136/85   98 


 


 10/15/20 21:22  10/16/20 07:44  10/16/20 07:44  10/15/20 21:22  10/16/20 07:45











General appearance: Present: no acute distress, well-nourished





- EENT


Eyes: Present: PERRL


ENT: hearing intact, clear oral mucosa





- Neck


Neck: Present: supple, normal ROM





- Respiratory


Respiratory effort: normal


Respiratory: bilateral: CTA





- Cardiovascular


Heart Sounds: Present: S1 & S2.  Absent: rub, click





- Extremities


Extremities: pulses symmetrical, No edema


Peripheral Pulses: within normal limits





- Abdominal


General gastrointestinal: Present: soft, non-tender, non-distended, normal bowel

sounds


Male genitourinary: Present: normal





- Integumentary


Integumentary: Present: clear, warm, dry





- Musculoskeletal


Musculoskeletal: gait normal, strength equal bilaterally





- Psychiatric


Psychiatric: appropriate mood/affect, intact judgment & insight





- Neurologic


Neurologic: CNII-XII intact, moves all extremities





Plan


Activity: advance as tolerated


Weight Bearing Status: Weight Bear as Tolerated


Diet: regular


Prescriptions: 


cefUROXime [Ceftin] 500 mg PO Q12H #14 tablet


Lisinopril 20 mg PO BID #60


methylPREDNISolone [Medrol 4MG DOSEPAK (21 tabs)] 4 mg PO DAILY #1 tab.ds.pk


guaiFENesin [Robitussin] 200 mg PO Q6H PRN #20 tablet


 PRN Reason: Cough


Budesonide/Formoterol Fumarate [Symbicort 160-4.5 Mcg Inhaler] 10.2 gm IH BID 30

Days  hfa.aer.ad


Azithromycin [Zithromax TAB] 500 mg PO QDAY #5 tablet

## 2020-10-16 NOTE — XRAY REPORT
CHEST 1 VIEW 



INDICATION / CLINICAL INFORMATION:

pna.



COMPARISON: 

10/13/2020



FINDINGS:



SUPPORT DEVICES: None.

HEART / MEDIASTINUM: No significant abnormality. 

LUNGS / PLEURA: No significant pulmonary or pleural abnormality. No pneumothorax. 



ADDITIONAL FINDINGS: No significant additional findings.



IMPRESSION:

No acute disease or interval change from 10/13/2020



Signer Name: Adam Cherry MD FACR 

Signed: 10/16/2020 10:51 AM

Workstation Name: [id number]

## 2020-11-23 ENCOUNTER — HOSPITAL ENCOUNTER (OUTPATIENT)
Dept: HOSPITAL 5 - ED | Age: 40
Setting detail: OBSERVATION
LOS: 3 days | Discharge: HOME | End: 2020-11-26
Attending: INTERNAL MEDICINE | Admitting: INTERNAL MEDICINE
Payer: SELF-PAY

## 2020-11-23 DIAGNOSIS — J96.01: ICD-10-CM

## 2020-11-23 DIAGNOSIS — R65.21: ICD-10-CM

## 2020-11-23 DIAGNOSIS — J18.9: ICD-10-CM

## 2020-11-23 DIAGNOSIS — Z20.828: ICD-10-CM

## 2020-11-23 DIAGNOSIS — J45.902: ICD-10-CM

## 2020-11-23 DIAGNOSIS — M10.9: ICD-10-CM

## 2020-11-23 DIAGNOSIS — F17.213: ICD-10-CM

## 2020-11-23 DIAGNOSIS — A41.9: Primary | ICD-10-CM

## 2020-11-23 DIAGNOSIS — J45.901: ICD-10-CM

## 2020-11-23 DIAGNOSIS — E43: ICD-10-CM

## 2020-11-23 PROCEDURE — 82728 ASSAY OF FERRITIN: CPT

## 2020-11-23 PROCEDURE — 36415 COLL VENOUS BLD VENIPUNCTURE: CPT

## 2020-11-23 PROCEDURE — 83615 LACTATE (LD) (LDH) ENZYME: CPT

## 2020-11-23 PROCEDURE — G0378 HOSPITAL OBSERVATION PER HR: HCPCS

## 2020-11-23 PROCEDURE — 94640 AIRWAY INHALATION TREATMENT: CPT

## 2020-11-23 PROCEDURE — 87070 CULTURE OTHR SPECIMN AEROBIC: CPT

## 2020-11-23 PROCEDURE — 96366 THER/PROPH/DIAG IV INF ADDON: CPT

## 2020-11-23 PROCEDURE — U0003 INFECTIOUS AGENT DETECTION BY NUCLEIC ACID (DNA OR RNA); SEVERE ACUTE RESPIRATORY SYNDROME CORONAVIRUS 2 (SARS-COV-2) (CORONAVIRUS DISEASE [COVID-19]), AMPLIFIED PROBE TECHNIQUE, MAKING USE OF HIGH THROUGHPUT TECHNOLOGIES AS DESCRIBED BY CMS-2020-01-R: HCPCS

## 2020-11-23 PROCEDURE — 96365 THER/PROPH/DIAG IV INF INIT: CPT

## 2020-11-23 PROCEDURE — 87116 MYCOBACTERIA CULTURE: CPT

## 2020-11-23 PROCEDURE — 82947 ASSAY GLUCOSE BLOOD QUANT: CPT

## 2020-11-23 PROCEDURE — 96376 TX/PRO/DX INJ SAME DRUG ADON: CPT

## 2020-11-23 PROCEDURE — 99284 EMERGENCY DEPT VISIT MOD MDM: CPT

## 2020-11-23 PROCEDURE — 96375 TX/PRO/DX INJ NEW DRUG ADDON: CPT

## 2020-11-23 PROCEDURE — 87040 BLOOD CULTURE FOR BACTERIA: CPT

## 2020-11-23 PROCEDURE — 86140 C-REACTIVE PROTEIN: CPT

## 2020-11-23 PROCEDURE — 71046 X-RAY EXAM CHEST 2 VIEWS: CPT

## 2020-11-23 PROCEDURE — 96367 TX/PROPH/DG ADDL SEQ IV INF: CPT

## 2020-11-23 PROCEDURE — 87205 SMEAR GRAM STAIN: CPT

## 2020-11-23 PROCEDURE — 80053 COMPREHEN METABOLIC PANEL: CPT

## 2020-11-23 PROCEDURE — 85025 COMPLETE CBC W/AUTO DIFF WBC: CPT

## 2020-11-23 PROCEDURE — 84145 PROCALCITONIN (PCT): CPT

## 2020-11-23 PROCEDURE — 36600 WITHDRAWAL OF ARTERIAL BLOOD: CPT

## 2020-11-23 PROCEDURE — 82803 BLOOD GASES ANY COMBINATION: CPT

## 2020-11-23 PROCEDURE — 85379 FIBRIN DEGRADATION QUANT: CPT

## 2020-11-23 PROCEDURE — 80048 BASIC METABOLIC PNL TOTAL CA: CPT

## 2020-11-24 LAB
ALBUMIN SERPL-MCNC: 1.8 G/DL (ref 3.9–5)
ALT SERPL-CCNC: 14 UNITS/L (ref 7–56)
BASOPHILS # (AUTO): 0.1 K/MM3 (ref 0–0.1)
BASOPHILS NFR BLD AUTO: 0.8 % (ref 0–1.8)
BUN SERPL-MCNC: 8 MG/DL (ref 9–20)
BUN/CREAT SERPL: 10 %
CALCIUM SERPL-MCNC: 8.1 MG/DL (ref 8.4–10.2)
CRP SERPL-MCNC: 1.8 MG/DL (ref 0–1.3)
EOSINOPHIL # BLD AUTO: 0.6 K/MM3 (ref 0–0.4)
EOSINOPHIL NFR BLD AUTO: 5.3 % (ref 0–4.3)
HCO3 BLDA-SCNC: 24.9 MMOL/L (ref 20–26)
HCT VFR BLD CALC: 39.4 % (ref 35.5–45.6)
HEMOLYSIS INDEX: 11
HGB BLD-MCNC: 12.6 GM/DL (ref 11.8–15.2)
LYMPHOCYTES # BLD AUTO: 3.6 K/MM3 (ref 1.2–5.4)
LYMPHOCYTES NFR BLD AUTO: 31.7 % (ref 13.4–35)
MCHC RBC AUTO-ENTMCNC: 32 % (ref 32–34)
MCV RBC AUTO: 65 FL (ref 84–94)
MONOCYTES # (AUTO): 0.5 K/MM3 (ref 0–0.8)
MONOCYTES % (AUTO): 4.3 % (ref 0–7.3)
PCO2 BLDA: 38.1 MM HG
PH BLDA: 7.43 PH UNITS (ref 7.35–7.45)
PLATELET # BLD: 330 K/MM3 (ref 140–440)
PO2 BLDA: 72 MM HG (ref 80–90)
RBC # BLD AUTO: 6.11 M/MM3 (ref 3.65–5.03)

## 2020-11-24 RX ADMIN — METHYLPREDNISOLONE SODIUM SUCCINATE SCH MG: 125 INJECTION, POWDER, FOR SOLUTION INTRAMUSCULAR; INTRAVENOUS at 09:46

## 2020-11-24 RX ADMIN — ARFORMOTEROL TARTRATE SCH MCG: 15 SOLUTION RESPIRATORY (INHALATION) at 20:51

## 2020-11-24 RX ADMIN — FAMOTIDINE SCH MG: 20 TABLET ORAL at 22:45

## 2020-11-24 RX ADMIN — METHYLPREDNISOLONE SODIUM SUCCINATE SCH MG: 125 INJECTION, POWDER, FOR SOLUTION INTRAMUSCULAR; INTRAVENOUS at 18:18

## 2020-11-24 RX ADMIN — ARFORMOTEROL TARTRATE SCH MCG: 15 SOLUTION RESPIRATORY (INHALATION) at 08:24

## 2020-11-24 RX ADMIN — BUDESONIDE SCH: 0.5 INHALANT RESPIRATORY (INHALATION) at 08:26

## 2020-11-24 RX ADMIN — BUDESONIDE SCH MG: 0.5 INHALANT RESPIRATORY (INHALATION) at 08:25

## 2020-11-24 RX ADMIN — ARFORMOTEROL TARTRATE SCH MCG: 15 SOLUTION RESPIRATORY (INHALATION) at 08:23

## 2020-11-24 RX ADMIN — DOCUSATE SODIUM SCH: 100 CAPSULE, LIQUID FILLED ORAL at 22:46

## 2020-11-24 RX ADMIN — DOCUSATE SODIUM SCH MG: 100 CAPSULE, LIQUID FILLED ORAL at 09:46

## 2020-11-24 RX ADMIN — BUDESONIDE SCH MG: 0.5 INHALANT RESPIRATORY (INHALATION) at 20:51

## 2020-11-24 RX ADMIN — FAMOTIDINE SCH MG: 20 TABLET ORAL at 09:46

## 2020-11-24 NOTE — EMERGENCY DEPARTMENT REPORT
- General


Chief Complaint: Upper Respiratory Infection


Stated Complaint: COLD SX/SOB


Time Seen by Provider: 11/24/20 00:48


Source: patient


Mode of arrival: Ambulatory


Limitations: No Limitations





- History of Present Illness


Initial Comments: 





Patient is a 40-year-old male presents emergency room with complaints of a 

productive cough that began a week ago.  He has associated shortness of breath, 

wheezing, chest tightness, fatigue.  He states he has had a couple of episodes 

of posttussive vomiting.  He denies any fever, abdominal pain or diarrhea.  He 

states that he just got a nebulizer machine a couple of days ago and has been 

doing treatments twice a day.  He denies any recent travel or known sick 

contacts.  He has a past medical history of asthma and bronchitis.  He states he

is a current smoker.  No allergies to medications.  Patient states that he was 

admitted in the hospital last month with pneumonia and believes he has pneumonia

again.





- Related Data


                                Home Medications











 Medication  Instructions  Recorded  Confirmed  Last Taken


 


ALBUTEROL NEB's [Proventil 0.083% 2.5 mg IH TID PRN 10/13/20 10/13/20 Unknown





NEBS]    


 


Furosemide [Lasix TAB] 40 mg PO DAILY 10/13/20 10/13/20 Unknown








                                  Previous Rx's











 Medication  Instructions  Recorded  Last Taken  Type


 


Azithromycin [Zithromax TAB] 500 mg PO QDAY #5 tablet 10/16/20 Unknown Rx


 


Budesonide/Formoterol Fumarate 10.2 gm IH BID 30 Days  hfa.aer.ad 10/16/20 

Unknown Rx





[Symbicort 160-4.5 Mcg Inhaler]    


 


Lisinopril 20 mg PO BID #60 10/16/20 Unknown Rx


 


cefUROXime [Ceftin] 500 mg PO Q12H #14 tablet 10/16/20 Unknown Rx


 


guaiFENesin [Robitussin] 200 mg PO Q6H PRN #20 tablet 10/16/20 Unknown Rx


 


methylPREDNISolone [Medrol 4MG 4 mg PO DAILY #1 tab.ds.pk 10/16/20 Unknown Rx





DOSEPAK (21 tabs)]    











                                    Allergies











Allergy/AdvReac Type Severity Reaction Status Date / Time


 


No Known Allergies Allergy   Verified 07/06/20 15:17














ED Review of Systems


ROS: 


Stated complaint: COLD SX/SOB


Other details as noted in HPI





Comment: All other systems reviewed and negative





ED Past Medical Hx





- Past Medical History


Hx Asthma: Yes


Additional medical history: gout, Bronchitis, pancreatitis





- Social History


Smoking Status: Current Every Day Smoker


Substance Use Type: None





- Medications


Home Medications: 


                                Home Medications











 Medication  Instructions  Recorded  Confirmed  Last Taken  Type


 


ALBUTEROL NEB's [Proventil 0.083% 2.5 mg IH TID PRN 10/13/20 10/13/20 Unknown Hi

story





NEBS]     


 


Furosemide [Lasix TAB] 40 mg PO DAILY 10/13/20 10/13/20 Unknown History


 


Azithromycin [Zithromax TAB] 500 mg PO QDAY #5 tablet 10/16/20  Unknown Rx


 


Budesonide/Formoterol Fumarate 10.2 gm IH BID 30 Days  hfa.aer.ad 10/16/20  

Unknown Rx





[Symbicort 160-4.5 Mcg Inhaler]     


 


Lisinopril 20 mg PO BID #60 10/16/20  Unknown Rx


 


cefUROXime [Ceftin] 500 mg PO Q12H #14 tablet 10/16/20  Unknown Rx


 


guaiFENesin [Robitussin] 200 mg PO Q6H PRN #20 tablet 10/16/20  Unknown Rx


 


methylPREDNISolone [Medrol 4MG 4 mg PO DAILY #1 tab.ds.pk 10/16/20  Unknown Rx





DOSEPAK (21 tabs)]     














ED Physical Exam





- General


Limitations: No Limitations


General appearance: alert, in no apparent distress





- Head


Head exam: Present: atraumatic, normocephalic





- Eye


Eye exam: Present: normal appearance





- ENT


ENT exam: Present: mucous membranes moist





- Respiratory


Respiratory exam: Present: wheezes, rhonchi, prolonged expiratory.  Absent: 

respiratory distress, rales, stridor, chest wall tenderness, accessory muscle 

use





- Cardiovascular


Cardiovascular Exam: Present: regular rate, normal rhythm, normal heart sounds. 

Absent: systolic murmur, diastolic murmur, rubs, gallop





- Neurological Exam


Neurological exam: Present: alert, oriented X3





- Psychiatric


Psychiatric exam: Present: normal affect, normal mood





- Skin


Skin exam: Present: warm, dry, intact





ED Course


                                   Vital Signs











  11/23/20 11/24/20





  22:53 03:06


 


Temperature 98.0 F 98.2 F


 


Pulse Rate 88 90


 


Respiratory 16 18





Rate  


 


Blood Pressure 151/102 


 


Blood Pressure  129/67





[Right]  


 


O2 Sat by Pulse 94 94





Oximetry  














- Reevaluation(s)


Reevaluation #1: 





11/24/20 03:26


Discussed case with Dr. Gambino, ER attending who agrees with admission, advised to

 consult with hospitalist regarding antibiotic coverage given that patient was 

admitted last month











- Consultations


Consultation #1: 





11/24/20 03:48


spoke to Dr. De Leon, hospitalist, will call back 


11/24/20 04:29


Discussed case with Dr. De Leon, hospitalist, he states that he will cover 

patient for potential hospital-acquired pneumonia, he will order antibiotics, he

 advised to order COVID-19 work-up, he will accept and resume care of patient, 

will admit to hospital, [facility] 

sat











ED Medical Decision Making





- Lab Data


Result diagrams: 


                                 11/24/20 01:32





                                 11/24/20 04:43








                                   Lab Results











  11/24/20 11/24/20 Range/Units





  01:32 01:32 


 


WBC  11.3 H   (4.5-11.0)  K/mm3


 


RBC  6.11 H   (3.65-5.03)  M/mm3


 


Hgb  12.6   (11.8-15.2)  gm/dl


 


Hct  39.4   (35.5-45.6)  %


 


MCV  65 L   (84-94)  fl


 


MCH  21 L   (28-32)  pg


 


MCHC  32   (32-34)  %


 


RDW  18.3 H   (13.2-15.2)  %


 


Plt Count  330   (140-440)  K/mm3


 


Lymph % (Auto)  31.7   (13.4-35.0)  %


 


Mono % (Auto)  4.3   (0.0-7.3)  %


 


Eos % (Auto)  5.3 H   (0.0-4.3)  %


 


Baso % (Auto)  0.8   (0.0-1.8)  %


 


Lymph # (Auto)  3.6   (1.2-5.4)  K/mm3


 


Mono # (Auto)  0.5   (0.0-0.8)  K/mm3


 


Eos # (Auto)  0.6 H   (0.0-0.4)  K/mm3


 


Baso # (Auto)  0.1   (0.0-0.1)  K/mm3


 


Seg Neutrophils %  57.9   (40.0-70.0)  %


 


Seg Neutrophils #  6.6   (1.8-7.7)  K/mm3


 


Sodium   140  (137-145)  mmol/L


 


Potassium   4.1  (3.6-5.0)  mmol/L


 


Chloride   102.3  ()  mmol/L


 


Carbon Dioxide   29  (22-30)  mmol/L


 


Anion Gap   13  mmol/L


 


BUN   8 L  (9-20)  mg/dL


 


Creatinine   0.8  (0.8-1.3)  mg/dL


 


Estimated GFR   > 60  ml/min


 


BUN/Creatinine Ratio   10  %


 


Glucose   95  ()  mg/dL


 


Calcium   8.1 L  (8.4-10.2)  mg/dL


 


Total Bilirubin   < 0.20  (0.1-1.2)  mg/dL


 


AST   20  (5-40)  units/L


 


ALT   14  (7-56)  units/L


 


Alkaline Phosphatase   111  ()  units/L


 


Total Protein   5.2 L  (6.3-8.2)  g/dL


 


Albumin   1.8 L  (3.9-5)  g/dL


 


Albumin/Globulin Ratio   0.5  %














- Radiology Data


Radiology results: report reviewed








 Ordering Physician: BEAU CALLEJAS 


 Date of Service: 11/24/20 


 Procedure(s): XR chest routine 2V 


 Accession Number(s): [id number] 





 cc: BEAU CALLEJAS 





 Fluoro Time In Minutes: 





 CHEST PA AND LATERAL VIEWS 





INDICATION: 


cough, sob. 





COMPARISON: 


10/16/2020 





FINDINGS: 





Support devices: None. 


Heart: Within normal limits. 


Lungs/Pleura: Ill-defined opacity is seen in the right infrahilar region. 











IMPRESSION: 


1. Mild pneumonia right infrahilar region. 





Signer Name: Neeraj De Paz MD 


Signed: 11/24/2020 1:32 AM 


Workstation Name: [id number] 








 Transcribed By: JEFE 


 Dictated By: Neeraj De Paz MD 


 Electronically Authenticated By: Neeraj De Paz MD 


 Signed Date/Time: 11/24/20 0132 











 DD/DT: 11/24/20 0131 


 TD/TT: 





- Medical Decision Making





Patient is a 40-year-old male presents emergency room with complaints of a 

productive cough that began a week ago.  He has associated shortness of breath, 

wheezing, chest tightness, fatigue.  He states he has had a couple of episodes 

of posttussive vomiting.  He denies any fever, abdominal pain or diarrhea.  He 

states that he just got a nebulizer machine a couple of days ago and has been 

doing treatments twice a day.  He denies any recent travel or known sick 

contacts.  He has a past medical history of asthma and bronchitis.  He states he

 is a current smoker.  No allergies to medications.  Patient states that he was 

admitted in the hospital last month with pneumonia and believes he has pneumonia

 again.  Vitals with hypoxia, otherwise normal.  On exam patient has wheezing 

and rhonchi no respiratory distress, no accessory muscle use.  Labs are stable. 

 Chest x-ray 1. Mild pneumonia right infrahilar region.  Patient given 

continuous neb treatment, steroids, azithromycin, ceftriaxone.  On reexamination

 patient continues to have diffuse wheezing, patient was ambulated on room air 

by paramedic Nikolai and his sats were 90 to 91%.  Patient will be admitted to 

hospitalist service due to pneumonia and hypoxia. Discussed case with Dr. Gambino, 

ER attending who agrees with admission, advised to consult with hospitalist 

regarding antibiotic coverage given that patient was admitted last 

month.Discussed case with Dr. De Leon, hospitalist, he states that he will cover 

patient for potential hospital-acquired pneumonia, he will order antibiotics, he

 advised to order COVID-19 work-up, he will accept and resume care of patient, 

will admit to hospital, advised to bridge patient to [facility] with continuous O2 

sat. 





- Differential Diagnosis


PNA, URI, asthma exacerbation, reactive airway, bronchitis, viral 


Critical care attestation.: 


If time is entered above; I have spent that time in minutes in the direct care 

of this critically ill patient, excluding procedure time.








ED Disposition


Clinical Impression: 


 Hypoxia





Pneumonia


Qualifiers:


 Pneumonia type: due to unspecified organism Laterality: right Lung location: 

middle lobe of lung Qualified Code(s): J18.9 - Pneumonia, unspecified organism





Status asthmaticus


Qualifiers:


 Asthma severity: unspecified severity Asthma persistence: unspecified Qualified

 Code(s): J45.902 - Unspecified asthma with status asthmaticus





Disposition: DC-09 OP ADMIT IP TO THIS HOSP


Is pt being admited?: Yes


Does the pt Need Aspirin: No


Condition: Serious

## 2020-11-24 NOTE — XRAY REPORT
CHEST PA AND LATERAL VIEWS



INDICATION: 

cough, sob.



COMPARISON: 

10/16/2020



FINDINGS:



Support devices: None.

Heart: Within normal limits. 

Lungs/Pleura: Ill-defined opacity is seen in the right infrahilar region.  







IMPRESSION:

1. Mild pneumonia right infrahilar region.



Signer Name: Neeraj De Paz MD 

Signed: 11/24/2020 1:32 AM

Workstation Name: [id number]

## 2020-11-24 NOTE — CONSULTATION
History of Present Illness





- Reason for Consult


Consult date: 11/24/20





- History of Present Illness





40-year-old man past medical history asthma, tobacco abuse presented to the 

hospital complaining of a productive cough.  He notes that this began 

approximately week prior to admission and is associated with shortness of 

breath, wheezing, fatigue.  He also complained of some episodes of posttussive 

vomiting.  He otherwise denies any symptoms.  He recently started nebulizer 

treatments for his asthma.  He was admitted to the hospital in October for 

possible pneumonia based on CT findings.





Afebrile since admission with a normal white count.  Currently on vancomycin and

cefepime.  Covid testing negative.  Blood cultures currently pending.





Imaging personally reviewed:


Chest x-ray: Mild right pneumonia.





Review of Systems: Bold if positive, otherwise negative


General: fevers, chills, rigors


HEENT: visual disturbance, diplopia, eye pain


Respiratory: cough, sputum, hemoptysis, shortness of breath


Cardiovascular: chest pain, syncope


Gastrointestinal: nausea, vomiting, diarrhea, abdominal pain


Genitourinary: dysuria, hematuria, flank pain


Musculoskeletal: neck pain, back pain, joint pain, edema 


Neurologic: headaches, seizures


Hematologic: easy bruising or bleeding


Endocrine: night sweats, acute weight loss


Skin: rash, jaundice, redness


Psychiatric: suicidal, homicidal ideation





Past History


Past Medical History: hypertension, other (asthma)


Past Surgical History: Other


Social history: single


Family history: no significant family history





Medications and Allergies


                                    Allergies











Allergy/AdvReac Type Severity Reaction Status Date / Time


 


No Known Allergies Allergy   Verified 07/06/20 15:17











                                Home Medications











 Medication  Instructions  Recorded  Confirmed  Last Taken  Type


 


ALBUTEROL NEB's [Proventil 0.083% 2.5 mg IH TID PRN 10/13/20 11/24/20 Unknown 

History





NEBS]     


 


guaiFENesin [Robitussin] 200 mg PO Q6H PRN #20 tablet 10/16/20 11/24/20 Unknown 

Rx











Active Meds: 


Active Medications





Acetaminophen (Tylenol)  650 mg PO Q4H PRN


   PRN Reason: Pain MILD(1-3)/Fever >100.5/HA


Albuterol (Proventil)  2.5 mg IH TID PRN


   PRN Reason: Wheezing


Arformoterol Tartrate (Brovana Nebu)  15 mcg IH Q12HRT [facility]


   Last Admin: 11/24/20 08:24 Dose:  15 mcg


   Documented by: 


Budesonide (Pulmicort)  0.5 mg IH Q12HRT [facility]


   Last Admin: 11/24/20 08:26 Dose:  Not Given


   Documented by: 


Docusate Sodium (Colace)  100 mg PO BID [facility]


   Last Admin: 11/24/20 09:46 Dose:  100 mg


   Documented by: 


Famotidine (Pepcid)  20 mg PO BID [facility]


   Last Admin: 11/24/20 09:46 Dose:  20 mg


   Documented by: 


Guaifenesin (Robitussin)  200 mg PO Q6H PRN


   PRN Reason: Cough


Cefepime HCl (Cefepime/Ns 2 Gm/100 Ml)  2 gm in 100 mls @ 200 mls/hr IV Q8H [facility];

 Protocol


   Last Admin: 11/24/20 09:45 Dose:  200 mls/hr


   Documented by: 


Vancomycin HCl 1,250 mg/ (Sodium Chloride)  275 mls @ 166.667 mls/hr IV Q12H [facility]


Magnesium Hydroxide (Milk Of Magnesia)  30 ml PO Q4H PRN


   PRN Reason: Constipation


Methylprednisolone Sodium Succinate (Solu-Medrol)  60 mg IV Q8H [facility]


   Last Admin: 11/24/20 09:46 Dose:  60 mg


   Documented by: 


Ondansetron HCl (Zofran)  4 mg IV Q4H PRN


   PRN Reason: Nausea And Vomiting











Physical Examination





- Physical Exam


Narrative exam: 





Physical Exam: 


Constitutional: Alert, cooperative. No acute distress


Head, Ears, Nose: Normocephalic, atraumatic. External ears, nose normal


Eyes: Conjunctivae/corneas clear. No icterus. No ptosis.


Neck: Supple, no meningeal signs


Oral: dentition fair, no thrush


Cardiovascular: S1, S2 normal. 


Respiratory: Good air entry, clear to auscultation bilaterally


GI: Soft, non-tender; bowel sounds normal. No peritoneal signs. 


Musculoskeletal: No pedal edema, no cyanosis.


Skin: No rash or abscess


Hem/Lymphatic: No palpable cervical or supraclavicular nodes. No lymphangitis


Psych: Mood ok. Affect normal


Neurological: Awake, alert, oriented. No gross abnormality





- Constitutional


Vitals: 


                                   Vital Signs











Temp Pulse Resp BP Pulse Ox


 


 97.7 F   83   20   127/72   98 


 


 11/24/20 10:17  11/24/20 10:17  11/24/20 10:17  11/24/20 10:17  11/24/20 10:17








                           Temperature -Last 24 Hours











Temperature                    97.7 F


 


Temperature                    97.2 F


 


Temperature                    98.2 F


 


Temperature                    98.0 F

















Results





- Labs


CBC & Chem 7: 


                                 11/24/20 01:32





                                 11/24/20 04:43


Labs: 


                              Abnormal lab results











  11/24/20 11/24/20 11/24/20 Range/Units





  01:32 01:32 04:43 


 


WBC  11.3 H    (4.5-11.0)  K/mm3


 


RBC  6.11 H    (3.65-5.03)  M/mm3


 


MCV  65 L    (84-94)  fl


 


MCH  21 L    (28-32)  pg


 


RDW  18.3 H    (13.2-15.2)  %


 


Eos % (Auto)  5.3 H    (0.0-4.3)  %


 


Eos # (Auto)  0.6 H    (0.0-0.4)  K/mm3


 


D-Dimer    575.67 H  (0-234)  ng/mlDDU


 


ABG pO2     (80.0-90.0)  mm Hg


 


Oxyhemoglobin     (95.0-99.0)  %


 


BUN   8 L   (9-20)  mg/dL


 


Glucose     ()  mg/dL


 


Calcium   8.1 L   (8.4-10.2)  mg/dL


 


Lactate Dehydrogenase     ()  units/L


 


C-Reactive Protein     (0.00-1.30)  mg/dL


 


Total Protein   5.2 L   (6.3-8.2)  g/dL


 


Albumin   1.8 L   (3.9-5)  g/dL














  11/24/20 11/24/20 Range/Units





  04:43 09:15 


 


WBC    (4.5-11.0)  K/mm3


 


RBC    (3.65-5.03)  M/mm3


 


MCV    (84-94)  fl


 


MCH    (28-32)  pg


 


RDW    (13.2-15.2)  %


 


Eos % (Auto)    (0.0-4.3)  %


 


Eos # (Auto)    (0.0-0.4)  K/mm3


 


D-Dimer    (0-234)  ng/mlDDU


 


ABG pO2   72.0 L  (80.0-90.0)  mm Hg


 


Oxyhemoglobin   93.4 L  (95.0-99.0)  %


 


BUN    (9-20)  mg/dL


 


Glucose  197 H   ()  mg/dL


 


Calcium    (8.4-10.2)  mg/dL


 


Lactate Dehydrogenase  187 H   ()  units/L


 


C-Reactive Protein  1.80 H   (0.00-1.30)  mg/dL


 


Total Protein    (6.3-8.2)  g/dL


 


Albumin    (3.9-5)  g/dL














Assessment and Plan





Cultures:


Blood culture 11/24/2020 pending





A/P: 50-year-old man past medical history asthma, tobacco abuse admitted to the 

hospital complaining of cough





#Questionable pneumonia: Agree with pulmonary assessment this is not likely to 

be a bacterial pneumonia given negative procalcitonin, normal white count, 

afebrile.  Diagnosis possibly a viral pneumonia, but could be noninfectious 

causes as detailed by pulmonary.  Would stop antibiotics.





#Asthma: Currently on steroids





#Tobacco abuse: Recommend cessation











Recs:


-Stop vancomycin and cefepime


-Follow-up pulmonary





Thank you for the consult, we will sign off.  Please call with questions.





MD [facility] ([facility])


O: [phone]


F: [phone]

## 2020-11-24 NOTE — CONSULTATION
History of Present Illness


Consult date: 11/24/20


Requesting physician: SUGAR JAIME


Reason for consult: asthma


History of present illness: 





39 y/o male, with multiple visits to the ED and admissions to the hospital in 

2020 admitted with worsening shortness of breath and cough.  He has also had 

post-tussive emesis as well.  He had a CTA done in July that showed patchy 

bilateral areas of GGO's.  His echo is consistent with diastolic dysfunction and

he has hypertension.  Today, radiology has called a right infrahilar infiltrate 

as pneumonia.  





Medications and Allergies


                                    Allergies











Allergy/AdvReac Type Severity Reaction Status Date / Time


 


No Known Allergies Allergy   Verified 07/06/20 15:17











                                Home Medications











 Medication  Instructions  Recorded  Confirmed  Last Taken  Type


 


ALBUTEROL NEB's [Proventil 0.083% 2.5 mg IH TID PRN 10/13/20 11/24/20 Unknown 

History





NEBS]     


 


guaiFENesin [Robitussin] 200 mg PO Q6H PRN #20 tablet 10/16/20 11/24/20 Unknown 

Rx











Active Meds: 


Active Medications





Acetaminophen (Tylenol)  650 mg PO Q4H PRN


   PRN Reason: Pain MILD(1-3)/Fever >100.5/HA


Albuterol (Proventil)  2.5 mg IH TID PRN


   PRN Reason: Wheezing


Arformoterol Tartrate (Brovana Nebu)  15 mcg IH Q12HRT [facility]


   Last Admin: 11/24/20 08:24 Dose:  15 mcg


   Documented by: 


Budesonide (Pulmicort)  0.5 mg IH Q12HRT [facility]


   Last Admin: 11/24/20 08:26 Dose:  Not Given


   Documented by: 


Docusate Sodium (Colace)  100 mg PO BID [facility]


Famotidine (Pepcid)  20 mg PO BID [facility]


Guaifenesin (Robitussin)  200 mg PO Q6H PRN


   PRN Reason: Cough


Vancomycin HCl 1,500 mg/ (Sodium Chloride)  530 mls @ 333 mls/hr IV ONCE ONE; 

Protocol


   Stop: 11/24/20 09:35


Cefepime HCl (Cefepime/Ns 2 Gm/100 Ml)  2 gm in 100 mls @ 200 mls/hr IV Q8H [facility];

Protocol


Magnesium Hydroxide (Milk Of Magnesia)  30 ml PO Q4H PRN


   PRN Reason: Constipation


Methylprednisolone Sodium Succinate (Solu-Medrol)  60 mg IV Q8H [facility]


Ondansetron HCl (Zofran)  4 mg IV Q4H PRN


   PRN Reason: Nausea And Vomiting











Physical Examination


Vital signs: 


                                   Vital Signs











Temp Pulse Resp BP Pulse Ox


 


 98.0 F   88   16   151/102   94 


 


 11/23/20 22:53  11/23/20 22:53  11/23/20 22:53  11/23/20 22:53  11/23/20 22:53














Results





- Laboratory Findings


CBC and BMP: 


                                 11/24/20 01:32





                                 11/24/20 04:43


PT/INR, D-dimer











D-Dimer  575.67 ng/mlDDU (0-234)  H  11/24/20  04:43    








Abnormal lab findings: 


                                  Abnormal Labs











  11/24/20 11/24/20 11/24/20





  01:32 01:32 04:43


 


WBC  11.3 H  


 


RBC  6.11 H  


 


MCV  65 L  


 


MCH  21 L  


 


RDW  18.3 H  


 


Eos % (Auto)  5.3 H  


 


Eos # (Auto)  0.6 H  


 


D-Dimer    575.67 H


 


BUN   8 L 


 


Glucose   


 


Calcium   8.1 L 


 


Lactate Dehydrogenase   


 


C-Reactive Protein   


 


Total Protein   5.2 L 


 


Albumin   1.8 L 














  11/24/20





  04:43


 


WBC 


 


RBC 


 


MCV 


 


MCH 


 


RDW 


 


Eos % (Auto) 


 


Eos # (Auto) 


 


D-Dimer 


 


BUN 


 


Glucose  197 H


 


Calcium 


 


Lactate Dehydrogenase  187 H


 


C-Reactive Protein  1.80 H


 


Total Protein 


 


Albumin 














- Diagnostic Findings


Chest x-ray: image reviewed





Assessment and Plan





39 y/o male with acute asthma flare





1.  Once off steroids as an outpatient, need to send IgE levels to assess for 

ABPA


2.  Agree with steroids now but would switch to orals as early as today.  Start 

at 60 and taper from there


3.  Suggest BID PPI therapy as GERD could be worsening asthma symptoms


4.  Will place on BID pulmicort and brovana therapy


5.  Not sure if funding is an issue, but needs to have long acting maintenance 

therapy as an outpatient


6.  At this point, doubt this is bacterial pneumonia, if able to provide sputum 

sample please send for culture, no real indication for bronch at this time.  

Thank you for this consult, will continue to follow with you.

## 2020-11-24 NOTE — HISTORY AND PHYSICAL REPORT
History of Present Illness


Date of examination: 11/24/20


Date of admission: 


11/24/20 04:33





Chief complaint: 


Shortness of breath





History of present illness: 


Patient is a 40-year-old male with hx of ASthma, tobacco use and recent 

hospitalization for pneumonia presents emergency room with complaints of a 

productive cough that began a week ago.  He has associated shortness of breath, 

wheezing, chest tightness, fatigue.  He states he has had a couple of episodes 

of posttussive vomiting.  He denies any fever, abdominal pain or diarrhea.  He 

states that he just got a nebulizer machine a couple of days ago and has been 

doing treatments twice a day.  He denies any recent travel or known sick 

contacts.  He has a past medical history of asthma and bronchitis.  He states he

is a current smoker.  No allergies to medications.  Patient states that he was 

admitted in the hospital last month with pneumonia  and at the time imaging 

studies CT was concerning for and believes he has pneumonia again.





He reports no recent sick contacts and was complaint with his meds








- Discharge Diagnoses


(1) Sepsis


Status: Acute   





(2) Acute hypoxemic respiratory failure


Status: Acute   





(3) Hypoxia


Status: Acute   





(4) Nicotine dependence


Status: Acute   


Qualifiers: 


   Nicotine product type: cigarettes   Substance use status: in withdrawal   

Qualified Code(s): F17.213 - Nicotine dependence, cigarettes, with withdrawal   





(5) PNA (pneumonia)


Status: Acute   


Qualifiers: 


   Pneumonia type: due to unspecified organism   Laterality: bilateral   Lung 

location: unspecified part of lung   Qualified Code(s): J18.9 - Pneumonia, 

unspecified organism   





(6) Asthma


Status: Chronic   





Past History


Past Medical History: hypertension, other (asthma)


Past Surgical History: Other


Social history: single


Family history: no significant family history





Medications and Allergies


                                    Allergies











Allergy/AdvReac Type Severity Reaction Status Date / Time


 


No Known Allergies Allergy   Verified 07/06/20 15:17











                                Home Medications











 Medication  Instructions  Recorded  Confirmed  Last Taken  Type


 


ALBUTEROL NEB's [Proventil 0.083% 2.5 mg IH TID PRN 10/13/20 11/24/20 Unknown 

History





NEBS]     


 


guaiFENesin [Robitussin] 200 mg PO Q6H PRN #20 tablet 10/16/20 11/24/20 Unknown 

Rx














Review of Systems


All systems: negative


Constitutional: no fever, no chills, no sweats, no anorexia, no fatigue, no 

weakness, no malaise


Cardiovascular: shortness of breath, no orthopnea, no palpitations, no rapid/

irregular heart beat


Respiratory: cough, cough with sputum





Exam





- Physical Exam


Narrative exam: 


VITAL SIGNS:  Reviewed.    


GENERAL:  The patient appears normally developed, Vital signs as documented.


HEAD:  No signs of head trauma.


EYES:  Pupils are equal.  Extraocular motions intact.  


EARS:  Hearing grossly intact.


MOUTH:  Oropharynx is normal. 


NECK:  No adenopathy, no JVD.  


CHEST:  Chest with clear breath sounds bilaterally.  No wheezes, rales, or 

rhonchi.  


CARDIAC:  Regular rate and rhythm.  S1 and S2, without murmurs, gallops, or 

rubs.


VASCULAR:  No Edema.  Peripheral pulses normal and equal in all extremities.


ABDOMEN:  Soft, non tender and non distended.  No   rebound or guarding, and no 

masses palpated.   Bowel Sounds normal.


MUSCULOSKELETAL:  Good range of motion of all major joints. Extremities without 

clubbing, cyanosis or edema.  


NEUROLOGIC EXAM:  Alert and oriented x 3   No focal sensory or strength 

deficits.   Speech normal.  Follows commands.


PSYCHIATRIC:  Mood normal.


SKIN:  detail exam as documented in skin assessment








- Constitutional


Vitals: 


                                        











Temp Pulse Resp BP Pulse Ox


 


 98.2 F   90   18   129/67   94 


 


 11/24/20 03:06  11/24/20 03:06  11/24/20 03:06  11/24/20 03:06  11/24/20 03:06














Results





- Labs


CBC & Chem 7: 


                                 11/24/20 01:32





                                 11/24/20 04:43


Labs: 


                             Laboratory Last Values











WBC  11.3 K/mm3 (4.5-11.0)  H  11/24/20  01:32    


 


RBC  6.11 M/mm3 (3.65-5.03)  H  11/24/20  01:32    


 


Hgb  12.6 gm/dl (11.8-15.2)   11/24/20  01:32    


 


Hct  39.4 % (35.5-45.6)   11/24/20  01:32    


 


MCV  65 fl (84-94)  L  11/24/20  01:32    


 


MCH  21 pg (28-32)  L  11/24/20  01:32    


 


MCHC  32 % (32-34)   11/24/20  01:32    


 


RDW  18.3 % (13.2-15.2)  H  11/24/20  01:32    


 


Plt Count  330 K/mm3 (140-440)   11/24/20  01:32    


 


Lymph % (Auto)  31.7 % (13.4-35.0)   11/24/20  01:32    


 


Mono % (Auto)  4.3 % (0.0-7.3)   11/24/20  01:32    


 


Eos % (Auto)  5.3 % (0.0-4.3)  H  11/24/20  01:32    


 


Baso % (Auto)  0.8 % (0.0-1.8)   11/24/20  01:32    


 


Lymph # (Auto)  3.6 K/mm3 (1.2-5.4)   11/24/20  01:32    


 


Mono # (Auto)  0.5 K/mm3 (0.0-0.8)   11/24/20  01:32    


 


Eos # (Auto)  0.6 K/mm3 (0.0-0.4)  H  11/24/20  01:32    


 


Baso # (Auto)  0.1 K/mm3 (0.0-0.1)   11/24/20  01:32    


 


Seg Neutrophils %  57.9 % (40.0-70.0)   11/24/20  01:32    


 


Seg Neutrophils #  6.6 K/mm3 (1.8-7.7)   11/24/20  01:32    


 


D-Dimer  575.67 ng/mlDDU (0-234)  H  11/24/20  04:43    


 


Sodium  140 mmol/L (137-145)   11/24/20  01:32    


 


Potassium  4.1 mmol/L (3.6-5.0)   11/24/20  01:32    


 


Chloride  102.3 mmol/L ()   11/24/20  01:32    


 


Carbon Dioxide  29 mmol/L (22-30)   11/24/20  01:32    


 


Anion Gap  13 mmol/L  11/24/20  01:32    


 


BUN  8 mg/dL (9-20)  L  11/24/20  01:32    


 


Creatinine  0.8 mg/dL (0.8-1.3)   11/24/20  01:32    


 


Estimated GFR  > 60 ml/min  11/24/20  01:32    


 


BUN/Creatinine Ratio  10 %  11/24/20  01:32    


 


Glucose  197 mg/dL ()  H  11/24/20  04:43    


 


Calcium  8.1 mg/dL (8.4-10.2)  L  11/24/20  01:32    


 


Ferritin  195.5 ng/mL (30.0-300.0)   11/24/20  04:43    


 


Total Bilirubin  < 0.20 mg/dL (0.1-1.2)   11/24/20  01:32    


 


AST  20 units/L (5-40)   11/24/20  01:32    


 


ALT  14 units/L (7-56)   11/24/20  01:32    


 


Alkaline Phosphatase  111 units/L ()   11/24/20  01:32    


 


Lactate Dehydrogenase  187 units/L ()  H  11/24/20  04:43    


 


C-Reactive Protein  1.80 mg/dL (0.00-1.30)  H  11/24/20  04:43    


 


Total Protein  5.2 g/dL (6.3-8.2)  L  11/24/20  01:32    


 


Albumin  1.8 g/dL (3.9-5)  L  11/24/20  01:32    


 


Albumin/Globulin Ratio  0.5 %  11/24/20  01:32    














Assessment and Plan


Assessment and plan: 


Patient is a 40-year-old male with hx of ASthma, tobacco use and recent 

hospitalization for pneumonia presents emergency room with complaints of a 

productive cough that began a week ago.  He has associated shortness of breath, 

wheezing, chest tightness, fatigue.  He states he has had a couple of episodes 

of posttussive vomiting.  He denies any fever, abdominal pain or diarrhea.  He 

states that he just got a nebulizer machine a couple of days ago and has been 

doing treatments twice a day.  He denies any recent travel or known sick 

contacts.  He has a past medical history of asthma and bronchitis.  He states he

 is a current smoker.  No allergies to medications.  Patient states that he was 

admitted in the hospital last month with pneumonia  and at the time imaging 

studies CT was concerning for and believes he has pneumonia again.





He reports no recent sick contacts and was complaint with his meds





   


Acute Hypoxica Respiratory failure


Asthma Exacerbation


Right Infrahilar Pneumonia possible GNR considering recent hospitalization


Suspect 2019 Coronavirus infection


Cough


Severe Protien calorie Malnutrition


TOBACCO USE DISORDER


SIRS without Organ dysfunction with no evidence of Sepsis








Admit to [facility] [place]


Rule out covid


Daily pulse ox and wean oxygen


Steriods for both the asthma and for Possible covid infection


Continue abx, but will adjust to cover for HCAP


Cough meds


Pulmonary and ID consult


Dietician consult


Gentle with fluid hydration till COVID ruled out.


Patient may need outpatient Immunology eval considering recurrent infection.


ID consultation if COVID + or worsening infections process. 





Advance Directives: Yes


Plan of care discussed with patient/family: Yes

## 2020-11-25 LAB
BASOPHILS # (AUTO): 0 K/MM3 (ref 0–0.1)
BASOPHILS NFR BLD AUTO: 0.2 % (ref 0–1.8)
BUN SERPL-MCNC: 12 MG/DL (ref 9–20)
BUN/CREAT SERPL: 15 %
CALCIUM SERPL-MCNC: 8.3 MG/DL (ref 8.4–10.2)
EOSINOPHIL # BLD AUTO: 0 K/MM3 (ref 0–0.4)
EOSINOPHIL NFR BLD AUTO: 0 % (ref 0–4.3)
HCT VFR BLD CALC: 37 % (ref 35.5–45.6)
HEMOLYSIS INDEX: 3
HGB BLD-MCNC: 11.5 GM/DL (ref 11.8–15.2)
LYMPHOCYTES # BLD AUTO: 1.2 K/MM3 (ref 1.2–5.4)
LYMPHOCYTES NFR BLD AUTO: 9.2 % (ref 13.4–35)
MCHC RBC AUTO-ENTMCNC: 31 % (ref 32–34)
MCV RBC AUTO: 65 FL (ref 84–94)
MONOCYTES # (AUTO): 0.1 K/MM3 (ref 0–0.8)
MONOCYTES % (AUTO): 1 % (ref 0–7.3)
PLATELET # BLD: 311 K/MM3 (ref 140–440)
RBC # BLD AUTO: 5.67 M/MM3 (ref 3.65–5.03)

## 2020-11-25 RX ADMIN — BUDESONIDE SCH MG: 0.5 INHALANT RESPIRATORY (INHALATION) at 08:45

## 2020-11-25 RX ADMIN — BUDESONIDE SCH MG: 0.5 INHALANT RESPIRATORY (INHALATION) at 21:17

## 2020-11-25 RX ADMIN — METHYLPREDNISOLONE SODIUM SUCCINATE SCH MG: 125 INJECTION, POWDER, FOR SOLUTION INTRAMUSCULAR; INTRAVENOUS at 10:38

## 2020-11-25 RX ADMIN — DOCUSATE SODIUM SCH MG: 100 CAPSULE, LIQUID FILLED ORAL at 10:38

## 2020-11-25 RX ADMIN — FAMOTIDINE SCH MG: 20 TABLET ORAL at 22:34

## 2020-11-25 RX ADMIN — ARFORMOTEROL TARTRATE SCH MCG: 15 SOLUTION RESPIRATORY (INHALATION) at 08:45

## 2020-11-25 RX ADMIN — FAMOTIDINE SCH MG: 20 TABLET ORAL at 10:38

## 2020-11-25 RX ADMIN — ARFORMOTEROL TARTRATE SCH MCG: 15 SOLUTION RESPIRATORY (INHALATION) at 21:17

## 2020-11-25 RX ADMIN — METHYLPREDNISOLONE SODIUM SUCCINATE SCH MG: 125 INJECTION, POWDER, FOR SOLUTION INTRAMUSCULAR; INTRAVENOUS at 00:49

## 2020-11-25 RX ADMIN — DOCUSATE SODIUM SCH: 100 CAPSULE, LIQUID FILLED ORAL at 22:34

## 2020-11-25 NOTE — PROGRESS NOTE
Assessment and Plan





41 y/o male with acute asthma flare





11/25/2020:  Suggest changing to Prednisone 60 and taper as follows.  60 daily 

for 4 days, 40 daily for 4 days, 20 daily for 4 days, 10 daily for 4 days then 

stop.  Will need BID PPI therapy at discharge and he needs to take this daily.  

If able, please send out on Symbicort 160 2 puffs BID.  Once off steroids for 

about 2 weeks then will check IGE levels as outpatient.  Will sign off for now. 

Call if questions.





1.  Once off steroids as an outpatient, need to send IgE levels to assess for 

ABPA


2.  Agree with steroids now but would switch to orals as early as today.  Start 

at 60 and taper from there


3.  Suggest BID PPI therapy as GERD could be worsening asthma symptoms


4.  Will place on BID pulmicort and brovana therapy


5.  Not sure if funding is an issue, but needs to have long acting maintenance 

therapy as an outpatient


6.  At this point, doubt this is bacterial pneumonia, if able to provide sputum 

sample please send for culture, no real indication for bronch at this time.  

Thank you for this consult, will continue to follow with you.  





Subjective


Date of service: 11/25/20


Interval history: 





No acute events.  Reviewed ID note from yesterday.





Objective


                               Vital Signs - 12hr











  11/25/20 11/25/20





  05:39 08:45


 


Temperature 97.4 F L 


 


Pulse Rate 72 


 


Pulse Rate [  79





Bilateral]  


 


Respiratory 20 





Rate  


 


Respiratory  18





Rate [Bilateral  





]  


 


Blood Pressure 155/89 


 


O2 Sat by Pulse 96 





Oximetry  











CBC and BMP: 


                                 11/25/20 05:06





                                 11/25/20 05:06


ABG, PT/INR, D-dimer: 


ABG











ABG pH  7.433 pH Units (7.350-7.450)   11/24/20  09:15    


 


ABG pCO2  38.1 mm Hg  11/24/20  09:15    


 


ABG pO2  72.0 mm Hg (80.0-90.0)  L  11/24/20  09:15    


 


ABG O2 Saturation  95.3 % (95.0-99.0)   11/24/20  09:15    





PT/INR, D-dimer











D-Dimer  575.67 ng/mlDDU (0-234)  H  11/24/20  04:43    








Abnormal lab findings: 


                                  Abnormal Labs











  11/24/20 11/24/20 11/24/20





  01:32 01:32 04:43


 


WBC  11.3 H  


 


RBC  6.11 H  


 


Hgb   


 


MCV  65 L  


 


MCH  21 L  


 


MCHC   


 


RDW  18.3 H  


 


Lymph % (Auto)   


 


Eos % (Auto)  5.3 H  


 


Eos # (Auto)  0.6 H  


 


Seg Neutrophils %   


 


Seg Neutrophils #   


 


D-Dimer    575.67 H


 


ABG pO2   


 


Oxyhemoglobin   


 


BUN   8 L 


 


Glucose   


 


Calcium   8.1 L 


 


Lactate Dehydrogenase   


 


C-Reactive Protein   


 


Total Protein   5.2 L 


 


Albumin   1.8 L 














  11/24/20 11/24/20 11/25/20





  04:43 09:15 05:06


 


WBC    13.3 H


 


RBC    5.67 H


 


Hgb    11.5 L


 


MCV    65 L


 


MCH    20 L


 


MCHC    31 L


 


RDW    17.8 H


 


Lymph % (Auto)    9.2 L


 


Eos % (Auto)   


 


Eos # (Auto)   


 


Seg Neutrophils %    89.6 H


 


Seg Neutrophils #    11.9 H


 


D-Dimer   


 


ABG pO2   72.0 L 


 


Oxyhemoglobin   93.4 L 


 


BUN   


 


Glucose  197 H  


 


Calcium   


 


Lactate Dehydrogenase  187 H  


 


C-Reactive Protein  1.80 H  


 


Total Protein   


 


Albumin   














  11/25/20





  05:06


 


WBC 


 


RBC 


 


Hgb 


 


MCV 


 


MCH 


 


MCHC 


 


RDW 


 


Lymph % (Auto) 


 


Eos % (Auto) 


 


Eos # (Auto) 


 


Seg Neutrophils % 


 


Seg Neutrophils # 


 


D-Dimer 


 


ABG pO2 


 


Oxyhemoglobin 


 


BUN 


 


Glucose  145 H


 


Calcium  8.3 L


 


Lactate Dehydrogenase 


 


C-Reactive Protein 


 


Total Protein 


 


Albumin

## 2020-11-25 NOTE — PROGRESS NOTE
Assessment and Plan


Assessment and plan: 


Patient is a 40-year-old male with hx of ASthma, tobacco use and recent 

hospitalization for pneumonia presents emergency room with complaints of a 

productive cough that began a week ago.  He has associated shortness of breath, 

wheezing, chest tightness, fatigue.  He states he has had a couple of episodes 

of posttussive vomiting.  He denies any fever, abdominal pain or diarrhea.  He 

states that he just got a nebulizer machine a couple of days ago and has been 

doing treatments twice a day.  He denies any recent travel or known sick 

contacts.  He has a past medical history of asthma and bronchitis.  He states he

is a current smoker.  No allergies to medications.  Patient states that he was 

admitted in the hospital last month with pneumonia  and at the time imaging 

studies CT was concerning for and believes he has pneumonia again.





He reports no recent sick contacts and was complaint with his meds





   


Acute Hypoxica Respiratory failure


Asthma Exacerbation


Right Infrahilar Pneumonia possible GNR considering recent hospitalization


Suspect 2019 Coronavirus infection


Cough


Severe Protien calorie Malnutrition


TOBACCO USE DISORDER


SIRS without Organ dysfunction with no evidence of Sepsis





PLAN


39 y/o male with acute asthma flare








Plan


Patient shows some clinical improvement.  COVID-19 testing is negative.


Pulmonary input as noted below and has been discussed with the patient in detail


11/25/2020:  Suggest changing to Prednisone 60 and taper as follows.  60 daily 

for 4 days, 40 daily for 4 days, 20 daily for 4 days, 10 daily for 4 days then 

stop.  Will need BID PPI therapy at discharge and he needs to take this daily.  

If able, please send out on Symbicort 160 2 puffs BID.  Once off steroids for 

about 2 weeks then will check IGE levels as outpatient.  Will sign off for now. 

Call if questions.





1.  Once off steroids as an outpatient, need to send IgE levels to assess for 

ABPA


2.  Agree with steroids now but would switch to orals as early as today.  Start 

at 60 and taper from there


3.  Suggest BID PPI therapy as GERD could be worsening asthma symptoms


4.  Will place on BID pulmicort and brovana therapy


5.  Not sure if funding is an issue, but needs to have long acting maintenance 

therapy as an outpatient


6.  At this point, doubt this is bacterial pneumonia, if able to provide sputum 

sample please send for culture, no real indication for bronch at this time.  

Thank you for this consult, will continue to follow with you.  





Antibiotics has been discontinued steroids adjusted as recommended


Plan discussed in detail with the patient anticipate discharge in a.m.


Patient may need outpatient Immunology eval considering recurrent infection.











History


Interval history: 


Patient seen and examined still with mild shortness of breath although improving

greatly.  No fever noted.








Hospitalist Physical





- Physical exam


Narrative exam: 


VITAL SIGNS:  Reviewed.    


GENERAL:  The patient appears normally developed, Vital signs as documented.


HEAD:  No signs of head trauma.


EYES:  Pupils are equal.  Extraocular motions intact.  


EARS:  Hearing grossly intact.


MOUTH:  Oropharynx is normal. 


NECK:  No adenopathy, no JVD.  


CHEST:  Chest with mild wheeze and improved aeration breath sounds bilaterally. 

No wheezes, rales, or rhonchi.  


CARDIAC:  Regular rate and rhythm.  S1 and S2, without murmurs, gallops, or 

rubs.


VASCULAR:  No Edema.  Peripheral pulses normal and equal in all extremities.


ABDOMEN:  Soft, non tender and non distended.  No   rebound or guarding, and no 

masses palpated.   Bowel Sounds normal.


MUSCULOSKELETAL:  Good range of motion of all major joints. Extremities without 

clubbing, cyanosis or edema.  


NEUROLOGIC EXAM:  Alert and oriented x 3   No focal sensory or strength 

deficits.   Speech normal.  Follows commands.


PSYCHIATRIC:  Mood normal.


SKIN:  detail exam as documented in skin assessment








- Constitutional


Vitals: 


                                        











Temp Pulse Resp BP Pulse Ox


 


 97.5 F L  69   18   146/78   91 


 


 11/25/20 11:36  11/25/20 11:36  11/25/20 11:36  11/25/20 11:36  11/25/20 11:36














Results





- Labs


CBC & Chem 7: 


                                 11/25/20 05:06





                                 11/25/20 05:06


Labs: 


                             Laboratory Last Values











WBC  13.3 K/mm3 (4.5-11.0)  H  11/25/20  05:06    


 


RBC  5.67 M/mm3 (3.65-5.03)  H  11/25/20  05:06    


 


Hgb  11.5 gm/dl (11.8-15.2)  L  11/25/20  05:06    


 


Hct  37.0 % (35.5-45.6)   11/25/20  05:06    


 


MCV  65 fl (84-94)  L  11/25/20  05:06    


 


MCH  20 pg (28-32)  L  11/25/20  05:06    


 


MCHC  31 % (32-34)  L  11/25/20  05:06    


 


RDW  17.8 % (13.2-15.2)  H  11/25/20  05:06    


 


Plt Count  311 K/mm3 (140-440)   11/25/20  05:06    


 


Lymph % (Auto)  9.2 % (13.4-35.0)  L  11/25/20  05:06    


 


Mono % (Auto)  1.0 % (0.0-7.3)   11/25/20  05:06    


 


Eos % (Auto)  0.0 % (0.0-4.3)   11/25/20  05:06    


 


Baso % (Auto)  0.2 % (0.0-1.8)   11/25/20  05:06    


 


Lymph # (Auto)  1.2 K/mm3 (1.2-5.4)   11/25/20  05:06    


 


Mono # (Auto)  0.1 K/mm3 (0.0-0.8)   11/25/20  05:06    


 


Eos # (Auto)  0.0 K/mm3 (0.0-0.4)   11/25/20  05:06    


 


Baso # (Auto)  0.0 K/mm3 (0.0-0.1)   11/25/20  05:06    


 


Seg Neutrophils %  89.6 % (40.0-70.0)  H  11/25/20  05:06    


 


Seg Neutrophils #  11.9 K/mm3 (1.8-7.7)  H  11/25/20  05:06    


 


D-Dimer  575.67 ng/mlDDU (0-234)  H  11/24/20  04:43    


 


ABG pH  7.433 pH Units (7.350-7.450)   11/24/20  09:15    


 


ABG pCO2  38.1 mm Hg  11/24/20  09:15    


 


ABG pO2  72.0 mm Hg (80.0-90.0)  L  11/24/20  09:15    


 


ABG HCO3  24.9 mmol/L (20.0-26.0)   11/24/20  09:15    


 


ABG O2 Saturation  95.3 % (95.0-99.0)   11/24/20  09:15    


 


ABG O2 Content  18.7  (0.0-44)   11/24/20  09:15    


 


ABG Base Excess  0.8 mmol/L (-2.0-3.0)   11/24/20  09:15    


 


ABG Hemoglobin  14.2 gm/dl (14.0-18.0)   11/24/20  09:15    


 


ABG Carboxyhemoglobin  1.4 % (0.0-5.0)   11/24/20  09:15    


 


ABG Methemoglobin  0.6 % (0.0-1.5)   11/24/20  09:15    


 


Oxyhemoglobin  93.4 % (95.0-99.0)  L  11/24/20  09:15    


 


FiO2  21 %  11/24/20  09:15    


 


Sodium  137 mmol/L (137-145)   11/25/20  05:06    


 


Potassium  4.1 mmol/L (3.6-5.0)   11/25/20  05:06    


 


Chloride  TNR   11/25/20  05:06    


 


Carbon Dioxide  28 mmol/L (22-30)   11/25/20  05:06    


 


Anion Gap  9 mmol/L  11/25/20  05:06    


 


BUN  12 mg/dL (9-20)   11/25/20  05:06    


 


Creatinine  0.8 mg/dL (0.8-1.3)   11/25/20  05:06    


 


Estimated GFR  > 60 ml/min  11/25/20  05:06    


 


BUN/Creatinine Ratio  15 %  11/25/20  05:06    


 


Glucose  145 mg/dL ()  H  11/25/20  05:06    


 


Calcium  8.3 mg/dL (8.4-10.2)  L  11/25/20  05:06    


 


Ferritin  195.5 ng/mL (30.0-300.0)   11/24/20  04:43    


 


Total Bilirubin  < 0.20 mg/dL (0.1-1.2)   11/24/20  01:32    


 


AST  20 units/L (5-40)   11/24/20  01:32    


 


ALT  14 units/L (7-56)   11/24/20  01:32    


 


Alkaline Phosphatase  111 units/L ()   11/24/20  01:32    


 


Lactate Dehydrogenase  187 units/L ()  H  11/24/20  04:43    


 


C-Reactive Protein  1.80 mg/dL (0.00-1.30)  H  11/24/20  04:43    


 


Total Protein  5.2 g/dL (6.3-8.2)  L  11/24/20  01:32    


 


Albumin  1.8 g/dL (3.9-5)  L  11/24/20  01:32    


 


Albumin/Globulin Ratio  0.5 %  11/24/20  01:32    


 


Procalcitonin  < 0.05 ng/mL (<0.15)   11/24/20  04:43    


 


Coronavirus (PCR)  Negative  (Negative)   11/24/20  Unknown











Microbiology: 


Microbiology





11/24/20 04:43   Peripheral/Venous   Blood Culture - Preliminary


                            NO GROWTH AFTER 24 HOURS


11/24/20 05:21   Peripheral/Venous   Blood Culture - Preliminary


                            NO GROWTH AFTER 24 HOURS








Bai/IV: 


                                        





Voiding Method                   Toilet


IV Catheter Type [Right          INT / Saline Lock


Antecubital]                     











Active Medications





- Current Medications


Current Medications: 














Generic Name Dose Route Start Last Admin





  Trade Name Freq  PRN Reason Stop Dose Admin


 


Acetaminophen  650 mg  11/24/20 07:20 





  Tylenol  PO  





  Q4H PRN  





  Pain MILD(1-3)/Fever >100.5/HA  


 


Albuterol  2.5 mg  11/24/20 07:26 





  Proventil  IH  





  TID PRN  





  Wheezing  


 


Arformoterol Tartrate  15 mcg  11/24/20 08:00  11/25/20 08:45





  Brovana Nebu  IH   15 mcg





  Q12HRT ALEIDA   Administration


 


Budesonide  0.5 mg  11/24/20 08:00  11/25/20 08:45





  Pulmicort  IH   0.5 mg





  Q12HRT ALEIDA   Administration


 


Docusate Sodium  100 mg  11/24/20 10:00  11/25/20 10:38





  Colace  PO   100 mg





  BID ALEIDA   Administration


 


Famotidine  20 mg  11/24/20 10:00  11/25/20 10:38





  Pepcid  PO   20 mg





  BID ALEIDA   Administration


 


Guaifenesin  200 mg  11/24/20 07:26  11/25/20 00:50





  Robitussin  PO   200 mg





  Q6H PRN   Administration





  Cough  


 


Magnesium Hydroxide  30 ml  11/24/20 07:20 





  Milk Of Magnesia  PO  





  Q4H PRN  





  Constipation  


 


Ondansetron HCl  4 mg  11/24/20 07:20 





  Zofran  IV  





  Q4H PRN  





  Nausea And Vomiting  














Nutrition/Malnutrition Assess





- Dietary Evaluation


Nutrition/Malnutrition Findings: 


                                        





Nutrition Notes                                            Start:  11/24/20 

08:23


Freq:                                                      Status: Active       




Protocol:                                                                       




 Document     11/24/20 08:23  LM  (Rec: 11/24/20 08:26  LM  [id number])


 Nutrition Notes


     Need for Assessment generated from:         MD Order


     Initial or Follow up                        Brief Note


     Other Pertinent Diagnosis                   ARF, Suspected COVID-19, PNA,


                                                 SIRS, asthma exacerbation


     Subjective/Other Information                MD consult for malnutrition.


                                                 Pt is in ED.


 Nutrition Intervention


     Follow-Up By:                               11/25/20


     Additional Comments                         F/U for assessment

## 2020-11-26 VITALS — DIASTOLIC BLOOD PRESSURE: 92 MMHG | SYSTOLIC BLOOD PRESSURE: 151 MMHG

## 2020-11-26 RX ADMIN — ARFORMOTEROL TARTRATE SCH MCG: 15 SOLUTION RESPIRATORY (INHALATION) at 08:02

## 2020-11-26 RX ADMIN — BUDESONIDE SCH MG: 0.5 INHALANT RESPIRATORY (INHALATION) at 08:02

## 2020-11-26 RX ADMIN — DOCUSATE SODIUM SCH: 100 CAPSULE, LIQUID FILLED ORAL at 09:38

## 2020-11-26 RX ADMIN — FAMOTIDINE SCH MG: 20 TABLET ORAL at 09:38

## 2020-11-26 NOTE — DISCHARGE SUMMARY
Providers





- Providers


Date of Admission: 


11/24/20 04:33





Attending physician: 


SUGAR JAIME MD





                                        





11/24/20 07:20


Consult to Physician [CONS] Routine 


   Comment: 


   Consulting Provider: YOSI GOYAL


   Physician Instructions: 


   Reason For Exam: RECURRENT PNEUMONIA





11/24/20 07:23


Consult to Dietitian/Nutrition [CONS] Routine 


   Physician Instructions: 


   Reason For Exam: 


   Reason for Consult: Malnutrition











Primary care physician: 


PRIMARY CARE MD








Hospitalization


Reason for admission: ASTHMA EXACERBATION


Condition: Serious


Hospital course: 


Patient is a 40-year-old male with hx of ASthma, tobacco use and recent 

hospitalization for pneumonia presents emergency room with complaints of a 

productive cough that began a week ago.  He has associated shortness of breath, 

wheezing, chest tightness, fatigue.  He states he has had a couple of episodes 

of posttussive vomiting.  He denies any fever, abdominal pain or diarrhea.  He 

states that he just got a nebulizer machine a couple of days ago and has been 

doing treatments twice a day.  He denies any recent travel or known sick 

contacts.  He has a past medical history of asthma and bronchitis.  He states he

 is a current smoker.  No allergies to medications.  Patient states that he was 

admitted in the hospital last month with pneumonia  and at the time imaging 

studies CT was concerning for and believes he has pneumonia again.





He reports no recent sick contacts and was complaint with his meds





11/25/2020:  Suggest changing to Prednisone 60 and taper as follows.  60 daily 

for 4 days, 40 daily for 4 days, 20 daily for 4 days, 10 daily for 4 days then 

stop.  Will need BID PPI therapy at discharge and he needs to take this daily.  

If able, please send out on Symbicort 160 2 puffs BID.  Once off steroids for 

about 2 weeks then will check IGE levels as outpatient.





Antibiotics has been discontinued steroids adjusted as recommended


Plan discussed in detail with the patient anticipate discharge in a.m.


Patient may need outpatient Immunology eval considering recurrent infection.








Patient clinically improve, Educated on his clinically disease process. No 


   


Acute Hypoxia Respiratory failure


Asthma Exacerbation


Right Infrahilar Pneumonia possible GNR considering recent hospitalization


Suspect 2019 Coronavirus infection


Cough


Severe Protien calorie Malnutrition


TOBACCO USE DISORDER


SIRS without Organ dysfunction with no evidence of Sepsis




















Disposition: DC-01 TO HOME OR SELFCARE


Time spent for discharge: 35 MINS





Core Measure Documentation





- Palliative Care


Palliative Care/ Comfort Measures: Not Applicable





- Core Measures


Any of the following diagnoses?: none





Exam





- Physical Exam


Narrative exam: 


VITAL SIGNS:  Reviewed.    


GENERAL:  The patient appears normally developed, Vital signs as documented.


HEAD:  No signs of head trauma.


EYES:  Pupils are equal.  Extraocular motions intact.  


EARS:  Hearing grossly intact.


MOUTH:  Oropharynx is normal. 


NECK:  No adenopathy, no JVD.  


CHEST:  Chest with mild wheeze and improved aeration breath sounds bilaterally. 

 No wheezes, rales, or rhonchi.  


CARDIAC:  Regular rate and rhythm.  S1 and S2, without murmurs, gallops, or 

rubs.


VASCULAR:  No Edema.  Peripheral pulses normal and equal in all extremities.


ABDOMEN:  Soft, non tender and non distended.  No   rebound or guarding, and no 

masses palpated.   Bowel Sounds normal.


MUSCULOSKELETAL:  Good range of motion of all major joints. Extremities without 

clubbing, cyanosis or edema.  


NEUROLOGIC EXAM:  Alert and oriented x 3   No focal sensory or strength 

deficits.   Speech normal.  Follows commands.


PSYCHIATRIC:  Mood normal.


SKIN:  detail exam as documented in skin assessment








- Constitutional


Vitals: 


                                        











Temp Pulse Resp BP Pulse Ox


 


 97.4 F L  63   18   151/92   91 


 


 11/26/20 06:40  11/26/20 06:40  11/26/20 06:40  11/26/20 06:40  11/26/20 06:40














Plan


Activity: advance as tolerated, fall precautions


Diet: low fat


Special Instructions: record daily weights, record daily BP diary


Additional Instructions: NEEDS TO HAVE WORK UP FOR ALLERGIC MANAGEMENT


Follow up with: 


PRIMARY CARE,MD [Primary Care Provider] - 3-5 Days


LIOR MEHTA MD [Staff Physician] - 7 Days


Prescriptions: 


Albuterol Mdi (or & Nicu Only) [ProAir HFA Inhaler] 2 puff IH QID PRN #8.5 gram


 PRN Reason: Shortness Of Breath


Budesonide/Formoterol Fumarate [Symbicort 160-4.5 Mcg Inhaler] 10.2 gm IH BID #1

hfa.aer.ad

## 2020-12-28 ENCOUNTER — HOSPITAL ENCOUNTER (INPATIENT)
Dept: HOSPITAL 5 - ED | Age: 40
LOS: 2 days | Discharge: HOME | DRG: 202 | End: 2020-12-30
Attending: INTERNAL MEDICINE | Admitting: INTERNAL MEDICINE
Payer: COMMERCIAL

## 2020-12-28 DIAGNOSIS — I10: ICD-10-CM

## 2020-12-28 DIAGNOSIS — J45.41: Primary | ICD-10-CM

## 2020-12-28 DIAGNOSIS — Z20.828: ICD-10-CM

## 2020-12-28 DIAGNOSIS — Z87.891: ICD-10-CM

## 2020-12-28 DIAGNOSIS — M10.9: ICD-10-CM

## 2020-12-28 DIAGNOSIS — N17.9: ICD-10-CM

## 2020-12-28 DIAGNOSIS — D72.10: ICD-10-CM

## 2020-12-28 DIAGNOSIS — J18.9: ICD-10-CM

## 2020-12-28 DIAGNOSIS — D64.9: ICD-10-CM

## 2020-12-28 DIAGNOSIS — R65.10: ICD-10-CM

## 2020-12-28 PROCEDURE — 71045 X-RAY EXAM CHEST 1 VIEW: CPT

## 2020-12-28 PROCEDURE — 84484 ASSAY OF TROPONIN QUANT: CPT

## 2020-12-28 PROCEDURE — 80053 COMPREHEN METABOLIC PANEL: CPT

## 2020-12-28 PROCEDURE — 87205 SMEAR GRAM STAIN: CPT

## 2020-12-28 PROCEDURE — 83615 LACTATE (LD) (LDH) ENZYME: CPT

## 2020-12-28 PROCEDURE — 82805 BLOOD GASES W/O2 SATURATION: CPT

## 2020-12-28 PROCEDURE — U0003 INFECTIOUS AGENT DETECTION BY NUCLEIC ACID (DNA OR RNA); SEVERE ACUTE RESPIRATORY SYNDROME CORONAVIRUS 2 (SARS-COV-2) (CORONAVIRUS DISEASE [COVID-19]), AMPLIFIED PROBE TECHNIQUE, MAKING USE OF HIGH THROUGHPUT TECHNOLOGIES AS DESCRIBED BY CMS-2020-01-R: HCPCS

## 2020-12-28 PROCEDURE — 36600 WITHDRAWAL OF ARTERIAL BLOOD: CPT

## 2020-12-28 PROCEDURE — 94640 AIRWAY INHALATION TREATMENT: CPT

## 2020-12-28 PROCEDURE — 86689 HTLV/HIV CONFIRMJ ANTIBODY: CPT

## 2020-12-28 PROCEDURE — 82565 ASSAY OF CREATININE: CPT

## 2020-12-28 PROCEDURE — 82728 ASSAY OF FERRITIN: CPT

## 2020-12-28 PROCEDURE — 84520 ASSAY OF UREA NITROGEN: CPT

## 2020-12-28 PROCEDURE — 71275 CT ANGIOGRAPHY CHEST: CPT

## 2020-12-28 PROCEDURE — 85610 PROTHROMBIN TIME: CPT

## 2020-12-28 PROCEDURE — 86140 C-REACTIVE PROTEIN: CPT

## 2020-12-28 PROCEDURE — 85730 THROMBOPLASTIN TIME PARTIAL: CPT

## 2020-12-28 PROCEDURE — 36415 COLL VENOUS BLD VENIPUNCTURE: CPT

## 2020-12-28 PROCEDURE — 80048 BASIC METABOLIC PNL TOTAL CA: CPT

## 2020-12-28 PROCEDURE — 85379 FIBRIN DEGRADATION QUANT: CPT

## 2020-12-28 PROCEDURE — 83520 IMMUNOASSAY QUANT NOS NONAB: CPT

## 2020-12-28 PROCEDURE — 94644 CONT INHLJ TX 1ST HOUR: CPT

## 2020-12-28 PROCEDURE — 87040 BLOOD CULTURE FOR BACTERIA: CPT

## 2020-12-28 PROCEDURE — 93005 ELECTROCARDIOGRAM TRACING: CPT

## 2020-12-28 PROCEDURE — 84145 PROCALCITONIN (PCT): CPT

## 2020-12-28 PROCEDURE — 85025 COMPLETE CBC W/AUTO DIFF WBC: CPT

## 2020-12-28 NOTE — EMERGENCY DEPARTMENT REPORT
ED General Adult HPI





- General


Chief complaint: Adult Asthma


Stated complaint: SOB COUGH FATIGUE SWEATS


Time Seen by Provider: 12/28/20 22:34


Source: patient


Mode of arrival: Ambulatory


Limitations: No Limitations





- History of Present Illness


Initial comments: 


Patient is a 40-year-old -American male with a history of asthma who 

presents for stated asthma attack x2 days.  Symptoms include shortness of breath

and wheezing.  Cough productive thick yellow.  There is no fever, no nausea 

vomiting.  Patient denies suspicious contacts or travel.  Symptoms are 

exacerbated by environmental exposure.  Symptoms are relieved by nothing tried. 

Patient advises he is out of budesonide inhaler.








- Related Data


                                  Previous Rx's











 Medication  Instructions  Recorded  Last Taken  Type


 


guaiFENesin [Robitussin] 200 mg PO Q6H PRN #20 tablet 10/16/20 Unknown Rx


 


Albuterol Mdi (or & Nicu Only) 2 puff IH QID PRN #8.5 gram 11/26/20 Unknown Rx





[ProAir HFA Inhaler]    


 


Budesonide/Formoterol Fumarate 10.2 gm IH BID #1 hfa.aer.ad 11/26/20 Unknown Rx





[Symbicort 160-4.5 Mcg Inhaler]    











                                    Allergies











Allergy/AdvReac Type Severity Reaction Status Date / Time


 


No Known Allergies Allergy   Verified 07/06/20 15:17














ED Review of Systems


ROS: 


Stated complaint: SOB COUGH FATIGUE SWEATS


Other details as noted in HPI





Constitutional: malaise.  denies: chills, fever


Eyes: denies: eye pain, eye discharge, vision change


ENT: congestion.  denies: ear pain, throat pain


Respiratory: cough, shortness of breath, wheezing


Cardiovascular: denies: chest pain, palpitations


Endocrine: see HPI


Gastrointestinal: denies: abdominal pain, nausea, vomiting, diarrhea


Genitourinary: denies: urgency, dysuria


Musculoskeletal: denies: back pain, joint swelling, arthralgia


Skin: denies: rash, lesions


Neurological: denies: headache, weakness, paresthesias


Psychiatric: denies: anxiety, depression


Hematological/Lymphatic: denies: easy bleeding, easy bruising





ED Past Medical Hx





- Past Medical History


Previous Medical History?: Yes


Hx Asthma: Yes


Additional medical history: gout, Bronchitis, pancreatitis





- Social History


Smoking Status: Former Smoker


Substance Use Type: None





- Medications


Home Medications: 


                                Home Medications











 Medication  Instructions  Recorded  Confirmed  Last Taken  Type


 


guaiFENesin [Robitussin] 200 mg PO Q6H PRN #20 tablet 10/16/20 11/24/20 Unknown 

Rx


 


Albuterol Mdi (or & Nicu Only) 2 puff IH QID PRN #8.5 gram 11/26/20  Unknown Rx





[ProAir HFA Inhaler]     


 


Budesonide/Formoterol Fumarate 10.2 gm IH BID #1 hfa.aer.ad 11/26/20  Unknown Rx





[Symbicort 160-4.5 Mcg Inhaler]     














ED Physical Exam





- General


Limitations: No Limitations


General appearance: alert, in no apparent distress





- Head


Head exam: Present: atraumatic, normocephalic





- Eye


Eye exam: Present: normal appearance, EOMI


Pupils: Present: normal accommodation





- ENT


ENT exam: Present: normal orophraynx, mucous membranes moist, TM's normal 

bilaterally, normal external ear exam





- Neck


Neck exam: Present: normal inspection, full ROM.  Absent: tenderness, 

lymphadenopathy





- Respiratory


Respiratory exam: Present: wheezes, stridor, prolonged expiratory.  Absent: 

rales, rhonchi, chest wall tenderness





- Cardiovascular


Cardiovascular Exam: Present: normal rhythm, tachycardia, normal heart sounds.  

Absent: systolic murmur, diastolic murmur, rubs, gallop





- GI/Abdominal


GI/Abdominal exam: Present: soft, normal bowel sounds.  Absent: distended, 

tenderness, bruit, hernia





- Rectal


Rectal exam: Present: deferred





- Extremities Exam


Extremities exam: Present: normal inspection, full ROM, normal capillary refill





- Back Exam


Back exam: Present: normal inspection, full ROM.  Absent: tenderness, CVA 

tenderness (R), CVA tenderness (L)





- Neurological Exam


Neurological exam: Present: alert, oriented X3, CN II-XII intact, normal gait





- Psychiatric


Psychiatric exam: Present: normal affect





- Skin


Skin exam: Present: warm, dry, intact, normal color.  Absent: rash





ED Course


                                   Vital Signs











  12/28/20 12/28/20





  22:25 22:57


 


Temperature 98.0 F 


 


Pulse Rate 127 H 


 


Pulse Rate [  122 H





Bilateral  





Throughout]  


 


Respiratory 24 





Rate  


 


Respiratory  22





Rate [Bilateral  





Throughout]  


 


Blood Pressure 127/72 


 


O2 Sat by Pulse 94 





Oximetry  














- Reevaluation(s)


Reevaluation #1: 


2300 : albuterol, atrovent neb,  Decadron IV, CXR: R Middle Lobe Pneumonia





12/29/20 2300





12/29/20 01:15





Reevaluation #2: 


symptom persist, second nebulizer, 5mg/ Atrovent 0,5 mg neb, Magnesium 2gm IVPB,

pt ambulated with increase in wheezing, plan: consult hospitalist for admission 

, Dx : Asthma exacerbation, Pneumonia RML. 


12/29/20 12 MN 





Reevaluation #3: 


DDimer 1284, CTA r/o PE pending. 


12/29/20 01:32








ED Medical Decision Making





- Lab Data


Result diagrams: 


                                 12/29/20 00:10





                                 12/29/20 00:10





- Radiology Data


Radiology results: report reviewed, image reviewed


Findings


Reporting MD: Donis Landeros


Dictation Time: December 28, 2020 22:25


: Not available


Transcription Date:


CHEST 1 VIEW 2255


INDICATION / CLINICAL INFORMATION: sob wheezing


COMPARISON: 11/24/2020


FINDINGS:


SUPPORT DEVICES: None


HEART / MEDIASTINUM: No significant abnormality.


LUNGS / PLEURA: New mild asymmetric density seen laterally in the right base. 

Possibly this could represent mild developing


pneumonitis. Left lung field is clear. No pneumothorax.


ADDITIONAL FINDINGS: No significant additional findings.


IMPRESSION: Possible mild pneumonitis in the right base


Signer Name: Donis Landeros MD


Signed: 12/28/2020 10:25 PM


Workstation Name: [id number]








- Medical Decision Making


 C Xray : Right Middle lob Pneumonia,  Asthma, symptoms persist after me

dications given in ed, plan: Admit to Hospitalist  Dx Asthma, Pnueumonia CAP, 

Covid screening pending, symptoms are improved with  medctions given in ED, pt 

is ambulatory with steady gait however with increase in wheezing with 

ambulations and environmental exposure, plan : admit to Hospitalist , consulted 

same at this time. recommendation:  admit, r/o covid 19, asthma exacerbation, 

pneumonia CAP, pt verbalized agreement and understanding of same.  pt care 

handoff to Hospitalist at this time. 


Critical care attestation.: 


If time is entered above; I have spent that time in minutes in the direct care 

of this critically ill patient, excluding procedure time.








ED Disposition


Clinical Impression: 


CAP (community acquired pneumonia)


Qualifiers:


 Laterality: right Lung location: middle lobe of lung Qualified Code(s): J18.9 -

Pneumonia, unspecified organism





Asthma


Qualifiers:


 Asthma severity: moderate Asthma persistence: persistent Asthma complication 

type: with acute exacerbation Qualified Code(s): J45.41 - Moderate persistent 

asthma with (acute) exacerbation





Disposition: DC-01 TO HOME OR SELFCARE


Is pt being admited?: Yes


Does the pt Need Aspirin: No


Condition: Stable


Instructions:  Asthma (ED), Bacterial Pneumonia (ED)


Time of Disposition: 01:50

## 2020-12-28 NOTE — XRAY REPORT
CHEST 1 VIEW 2255



INDICATION / CLINICAL INFORMATION: sob wheezing



COMPARISON: 11/24/2020



FINDINGS:



SUPPORT DEVICES: None



HEART / MEDIASTINUM: No significant abnormality. 



LUNGS / PLEURA: New mild asymmetric density seen laterally in the right base. Possibly this could rep
resent mild developing pneumonitis. Left lung field is clear. No pneumothorax. 



ADDITIONAL FINDINGS: No significant additional findings.



IMPRESSION: Possible mild pneumonitis in the right base



Signer Name: Donis Landeros MD 

Signed: 12/28/2020 11:25 PM

Workstation Name: [id number]

## 2020-12-29 LAB
ALBUMIN SERPL-MCNC: 1.4 G/DL (ref 3.9–5)
ALT SERPL-CCNC: 10 UNITS/L (ref 7–56)
APTT BLD: 42.8 SEC. (ref 24.2–36.6)
BASOPHILS # (AUTO): 0.1 K/MM3 (ref 0–0.1)
BASOPHILS NFR BLD AUTO: 0.4 % (ref 0–1.8)
BUN SERPL-MCNC: 10 MG/DL (ref 9–20)
BUN/CREAT SERPL: 9 %
CALCIUM SERPL-MCNC: 7.3 MG/DL (ref 8.4–10.2)
CRP SERPL-MCNC: 8.5 MG/DL (ref 0–1.3)
CRP SERPL-MCNC: 8.9 MG/DL (ref 0–1.3)
EOSINOPHIL # BLD AUTO: 0.6 K/MM3 (ref 0–0.4)
EOSINOPHIL NFR BLD AUTO: 4.7 % (ref 0–4.3)
HCT VFR BLD CALC: 34.1 % (ref 35.5–45.6)
HEMOLYSIS INDEX: 3
HGB BLD-MCNC: 11 GM/DL (ref 11.8–15.2)
INR PPP: 1.16 (ref 0.87–1.13)
LYMPHOCYTES # BLD AUTO: 1.8 K/MM3 (ref 1.2–5.4)
LYMPHOCYTES NFR BLD AUTO: 12.9 % (ref 13.4–35)
MCHC RBC AUTO-ENTMCNC: 32 % (ref 32–34)
MCV RBC AUTO: 64 FL (ref 84–94)
MONOCYTES # (AUTO): 0.4 K/MM3 (ref 0–0.8)
MONOCYTES % (AUTO): 2.9 % (ref 0–7.3)
PLATELET # BLD: 274 K/MM3 (ref 140–440)
RBC # BLD AUTO: 5.29 M/MM3 (ref 3.65–5.03)

## 2020-12-29 PROCEDURE — 4A033R1 MEASUREMENT OF ARTERIAL SATURATION, PERIPHERAL, PERCUTANEOUS APPROACH: ICD-10-PCS | Performed by: INTERNAL MEDICINE

## 2020-12-29 RX ADMIN — IPRATROPIUM BROMIDE AND ALBUTEROL SULFATE SCH AMPUL: .5; 3 SOLUTION RESPIRATORY (INHALATION) at 21:46

## 2020-12-29 RX ADMIN — METHYLPREDNISOLONE SODIUM SUCCINATE SCH MG: 40 INJECTION, POWDER, FOR SOLUTION INTRAMUSCULAR; INTRAVENOUS at 06:45

## 2020-12-29 RX ADMIN — Medication SCH ML: at 10:17

## 2020-12-29 RX ADMIN — NICOTINE SCH: 14 PATCH TRANSDERMAL at 10:20

## 2020-12-29 RX ADMIN — METHYLPREDNISOLONE SODIUM SUCCINATE SCH MG: 40 INJECTION, POWDER, FOR SOLUTION INTRAMUSCULAR; INTRAVENOUS at 13:06

## 2020-12-29 RX ADMIN — CEFEPIME SCH MLS/HR: 1 INJECTION, POWDER, FOR SOLUTION INTRAMUSCULAR; INTRAVENOUS at 06:46

## 2020-12-29 RX ADMIN — BUDESONIDE SCH MG: 0.5 INHALANT RESPIRATORY (INHALATION) at 21:46

## 2020-12-29 RX ADMIN — ENOXAPARIN SODIUM SCH MG: 100 INJECTION SUBCUTANEOUS at 10:16

## 2020-12-29 RX ADMIN — IPRATROPIUM BROMIDE AND ALBUTEROL SULFATE SCH AMPUL: .5; 3 SOLUTION RESPIRATORY (INHALATION) at 10:46

## 2020-12-29 RX ADMIN — IPRATROPIUM BROMIDE AND ALBUTEROL SULFATE SCH AMPUL: .5; 3 SOLUTION RESPIRATORY (INHALATION) at 15:20

## 2020-12-29 RX ADMIN — CEFEPIME SCH MLS/HR: 1 INJECTION, POWDER, FOR SOLUTION INTRAMUSCULAR; INTRAVENOUS at 13:54

## 2020-12-29 RX ADMIN — Medication SCH ML: at 22:49

## 2020-12-29 RX ADMIN — BUDESONIDE SCH MG: 0.5 INHALANT RESPIRATORY (INHALATION) at 10:46

## 2020-12-29 RX ADMIN — METHYLPREDNISOLONE SODIUM SUCCINATE SCH MG: 40 INJECTION, POWDER, FOR SOLUTION INTRAMUSCULAR; INTRAVENOUS at 17:56

## 2020-12-29 NOTE — HISTORY AND PHYSICAL REPORT
History of Present Illness


Date of examination: 12/29/20


Date of admission: 


12/29/2020


Chief complaint: 





SOB and weakness


History of present illness: 





-American male with history of asthma, gout, bronchitis, pancreatitis who

presents to [facility] ED with complaints of shortness of breath and generalized 

weakness x2 days.  Patient states that he has been having increased shortness of

breath accompanied with wheezing and cough with occasional thick white/yellow sp

utum production.  He is unable to take more than 10 steps without becoming weak 

and short of breath.  Review of medical record shows patient was admitted 

approximately 1 month ago with similar complaints.  At which time he was tested 

for Covid and was negative.  He endorses chills, noncardiac chest pain, 

generalized weakness, and loss of smell x2 weeks.





Denies nausea, vomiting, fever, headache, loss of taste, sore throat, abdominal 

pain, chest pain, recent sick contacts





Past History


Past Medical History: other (Asthma, gout, bronchitis, prior keratitis)


Past Surgical History: No surgical history


Social history: single, lives with family, smoking.  denies: alcohol abuse, 

prescription drug abuse, IV drug use


Family history: other (Denies family medical history)





Medications and Allergies


                                    Allergies











Allergy/AdvReac Type Severity Reaction Status Date / Time


 


No Known Allergies Allergy   Verified 07/06/20 15:17











                                Home Medications











 Medication  Instructions  Recorded  Confirmed  Last Taken  Type


 


guaiFENesin [Robitussin] 200 mg PO Q6H PRN #20 tablet 10/16/20 11/24/20 Unknown 

Rx


 


Albuterol Mdi (or & Nicu Only) 2 puff IH QID PRN #8.5 gram 11/26/20  Unknown Rx





[ProAir HFA Inhaler]     


 


Budesonide/Formoterol Fumarate 10.2 gm IH BID #1 hfa.aer.ad 11/26/20  Unknown Rx





[Symbicort 160-4.5 Mcg Inhaler]     











Active Meds: 


Active Medications





Acetaminophen (Acetaminophen 325 Mg Tab)  650 mg PO Q4H PRN


   PRN Reason: Pain MILD(1-3)/Fever >100.5/HA


Albuterol/Ipratropium (Ipratropium/Albuterol Sulfate 3 Ml Ampul.Neb)  1 ampul IH

Q6HRT ALEIDA


Budesonide (Budesonide 0.5 Mg/2 Ml Nebu)  0.5 mg IH Q12HRT ALEIDA


Enoxaparin Sodium (Enoxaparin 40 Mg/0.4 Ml Inj)  40 mg SUB-Q QDAY [facility]


Guaifenesin (Guaifenesin Er 600 Mg Tab)  600 mg PO BID [facility]


Calcium Gluconate 1,000 mg/ (Sodium Chloride)  110 mls @ 660 mls/hr IV ONCE ONE


   Stop: 12/29/20 02:42


Cefepime HCl (Cefepime/Ns 1 Gm/100 Ml)  1 gm in 100 mls @ 200 mls/hr IV Q8H ALEIDA;

Protocol


Methylprednisolone Sodium Succinate (Methylprednisolone Sod Succinate 40 Mg/1 Ml

Inj)  40 mg IV Q6HR [facility]


Nicotine (Nicotine 14 Mg/24 Hr Patch)  14 mg TD QDAY ALEIDA


Ondansetron HCl (Ondansetron 4 Mg/2 Ml Inj)  4 mg IV Q8H PRN


   PRN Reason: Nausea And Vomiting


Sodium Chloride (Sodium Chloride 0.9% 10 Ml Flush Syringe)  10 ml IV BID ALEIDA


Sodium Chloride (Sodium Chloride 0.9% 10 Ml Flush Syringe)  10 ml IV PRN PRN


   PRN Reason: LINE FLUSH











Review of Systems


All systems: negative (Except as noted in HPI)





Exam





- Physical Exam


Narrative exam: 





Physical exam





General appearance: Present: No acute distress, alert and oriented 3, -

American male  





- EENT


Eyes: Present: PERRL, EOM intact


ENT: hearing intact, normal dentition





- Neck


Neck: Present: supple, normal ROM





- Respiratory


Respiratory effort: Non-labored


Respiratory: Scattered wheezing with prolonged expiratory phase





- Cardiovascular


Heart rate: 122 (bpm)


Rhythm: Sinus tachycardia


Heart Sounds: Present: S1 & S2.  Absent: rub, click





- Extremities


Extremities: no ischemia, pulses intact, 





- Peripheral Assessment


Peripheral Pulses: within normal limits


 


- Abdominal


General gastrointestinal: soft, non-tender, normal bowel sounds





- Integumentary


Integumentary: Present: warm, dry





- Musculoskeletal


Musculoskeletal: Able to move all extremities





-Neurological


Neurological: CN II-XII intact





- Psychiatric


Psychiatric: Appropriate for situation ,cooperative





- Constitutional


Vitals: 


                                        











Temp Pulse Resp BP Pulse Ox


 


 98.0 F   122 H  22   127/72   94 


 


 12/28/20 22:25  12/28/20 22:57  12/28/20 22:57  12/28/20 22:25  12/28/20 22:25














HEART Score





- HEART Score


Troponin: 


                                        











WBC  13.8 K/mm3 (4.5-11.0)  H  12/29/20  00:10    


 


RBC  5.29 M/mm3 (3.65-5.03)  H  12/29/20  00:10    


 


Hgb  11.0 gm/dl (11.8-15.2)  L  12/29/20  00:10    


 


Hct  34.1 % (35.5-45.6)  L  12/29/20  00:10    


 


MCV  64 fl (84-94)  L  12/29/20  00:10    


 


MCH  21 pg (28-32)  L  12/29/20  00:10    


 


MCHC  32 % (32-34)   12/29/20  00:10    


 


RDW  16.9 % (13.2-15.2)  H  12/29/20  00:10    


 


Plt Count  274 K/mm3 (140-440)   12/29/20  00:10    


 


Lymph % (Auto)  12.9 % (13.4-35.0)  L  12/29/20  00:10    


 


Mono % (Auto)  2.9 % (0.0-7.3)   12/29/20  00:10    


 


Eos % (Auto)  4.7 % (0.0-4.3)  H  12/29/20  00:10    


 


Baso % (Auto)  0.4 % (0.0-1.8)   12/29/20  00:10    


 


Lymph # (Auto)  1.8 K/mm3 (1.2-5.4)   12/29/20  00:10    


 


Mono # (Auto)  0.4 K/mm3 (0.0-0.8)   12/29/20  00:10    


 


Eos # (Auto)  0.6 K/mm3 (0.0-0.4)  H  12/29/20  00:10    


 


Baso # (Auto)  0.1 K/mm3 (0.0-0.1)   12/29/20  00:10    


 


Seg Neutrophils %  79.1 % (40.0-70.0)  H  12/29/20  00:10    


 


Seg Neutrophils #  11.0 K/mm3 (1.8-7.7)  H  12/29/20  00:10    


 


D-Dimer  1284.75 ng/mlDDU (0-234)  H  12/29/20  00:10    


 


Sodium  136 mmol/L (137-145)  L  12/29/20  00:10    


 


Potassium  3.7 mmol/L (3.6-5.0)   12/29/20  00:10    


 


Chloride  101.7 mmol/L ()   12/29/20  00:10    


 


Carbon Dioxide  23 mmol/L (22-30)   12/29/20  00:10    


 


Anion Gap  15 mmol/L  12/29/20  00:10    


 


BUN  10 mg/dL (9-20)   12/29/20  00:10    


 


Creatinine  1.1 mg/dL (0.8-1.3)   12/29/20  00:10    


 


Estimated GFR  > 60 ml/min  12/29/20  00:10    


 


BUN/Creatinine Ratio  9 %  12/29/20  00:10    


 


Glucose  150 mg/dL ()  H  12/29/20  00:10    


 


Calcium  7.3 mg/dL (8.4-10.2)  L  12/29/20  00:10    


 


Total Bilirubin  0.20 mg/dL (0.1-1.2)   12/29/20  00:10    


 


AST  15 units/L (5-40)   12/29/20  00:10    


 


ALT  10 units/L (7-56)   12/29/20  00:10    


 


Alkaline Phosphatase  134 units/L ()  H  12/29/20  00:10    


 


Troponin T  < 0.010 ng/mL (0.00-0.029)   12/29/20  01:43    


 


Total Protein  5.9 g/dL (6.3-8.2)  L  12/29/20  00:10    


 


Albumin  1.4 g/dL (3.9-5)  L  12/29/20  00:10    


 


Albumin/Globulin Ratio  0.3 %  12/29/20  00:10    














Results





- Labs


CBC & Chem 7: 


                                 12/29/20 00:10





                                 12/29/20 00:10


Labs: 


                             Laboratory Last Values











WBC  13.8 K/mm3 (4.5-11.0)  H  12/29/20  00:10    


 


RBC  5.29 M/mm3 (3.65-5.03)  H  12/29/20  00:10    


 


Hgb  11.0 gm/dl (11.8-15.2)  L  12/29/20  00:10    


 


Hct  34.1 % (35.5-45.6)  L  12/29/20  00:10    


 


MCV  64 fl (84-94)  L  12/29/20  00:10    


 


MCH  21 pg (28-32)  L  12/29/20  00:10    


 


MCHC  32 % (32-34)   12/29/20  00:10    


 


RDW  16.9 % (13.2-15.2)  H  12/29/20  00:10    


 


Plt Count  274 K/mm3 (140-440)   12/29/20  00:10    


 


Lymph % (Auto)  12.9 % (13.4-35.0)  L  12/29/20  00:10    


 


Mono % (Auto)  2.9 % (0.0-7.3)   12/29/20  00:10    


 


Eos % (Auto)  4.7 % (0.0-4.3)  H  12/29/20  00:10    


 


Baso % (Auto)  0.4 % (0.0-1.8)   12/29/20  00:10    


 


Lymph # (Auto)  1.8 K/mm3 (1.2-5.4)   12/29/20  00:10    


 


Mono # (Auto)  0.4 K/mm3 (0.0-0.8)   12/29/20  00:10    


 


Eos # (Auto)  0.6 K/mm3 (0.0-0.4)  H  12/29/20  00:10    


 


Baso # (Auto)  0.1 K/mm3 (0.0-0.1)   12/29/20  00:10    


 


Seg Neutrophils %  79.1 % (40.0-70.0)  H  12/29/20  00:10    


 


Seg Neutrophils #  11.0 K/mm3 (1.8-7.7)  H  12/29/20  00:10    


 


D-Dimer  1284.75 ng/mlDDU (0-234)  H  12/29/20  00:10    


 


Sodium  136 mmol/L (137-145)  L  12/29/20  00:10    


 


Potassium  3.7 mmol/L (3.6-5.0)   12/29/20  00:10    


 


Chloride  101.7 mmol/L ()   12/29/20  00:10    


 


Carbon Dioxide  23 mmol/L (22-30)   12/29/20  00:10    


 


Anion Gap  15 mmol/L  12/29/20  00:10    


 


BUN  10 mg/dL (9-20)   12/29/20  00:10    


 


Creatinine  1.1 mg/dL (0.8-1.3)   12/29/20  00:10    


 


Estimated GFR  > 60 ml/min  12/29/20  00:10    


 


BUN/Creatinine Ratio  9 %  12/29/20  00:10    


 


Glucose  150 mg/dL ()  H  12/29/20  00:10    


 


Calcium  7.3 mg/dL (8.4-10.2)  L  12/29/20  00:10    


 


Total Bilirubin  0.20 mg/dL (0.1-1.2)   12/29/20  00:10    


 


AST  15 units/L (5-40)   12/29/20  00:10    


 


ALT  10 units/L (7-56)   12/29/20  00:10    


 


Alkaline Phosphatase  134 units/L ()  H  12/29/20  00:10    


 


Troponin T  < 0.010 ng/mL (0.00-0.029)   12/29/20  01:43    


 


Total Protein  5.9 g/dL (6.3-8.2)  L  12/29/20  00:10    


 


Albumin  1.4 g/dL (3.9-5)  L  12/29/20  00:10    


 


Albumin/Globulin Ratio  0.3 %  12/29/20  00:10    














- Imaging and Cardiology


Chest x-ray: report reviewed, image reviewed (Possible mild pneumonitis in the 

right base)





Assessment and Plan


Assessment and plan: 





-American male with history of asthma, gout, bronchitis, pancreatitis who

presents to [facility] ED with complaints of chills, noncardiac chest pain, shortness

of breath and generalized weakness x2 days.  








Suspected COVID-19 infection


-Complains of loss of smell x2 weeks, increased shortness of breath, generalized

weakness


-Last tested for Covid on 11/24 with negative results


-D-dimer elevated, CT chest pending


-Covid swab pending


-On subcu Lovenox for DVT PPx


-Supportive care





Acute Exacerbation Asthma 


-CXR shows mild pneumonitis and right lung base


-Continue supportive care


-Scheduled Duo Nebs and Pulmicort


-IV systemic steroids


-Mucinex


-Start IV abx





SIRS


-Leukocytosis 13.8 ( up from 13.3 on 11/25/20)


-Tachycardic with heart rate 122 bpm


-Afebrile


-Blood cultures and sputum pending


-Continue to workup source of infection


-Follow up on labs


-Continue supportive care





Pneumonitis


-In right lung base seen on today's CXR


-On IV ABX


-Day team may consider ID and/or consult





Anemia 


-Hemoglobin on admission 11.0


-Stable


-No s/s of active bleeding


-Continue to monitor hemoglobin


-Transfuse as needed





DVT PPX


-On Lovenox

















Advance Directives: No


VTE prophylaxis?: Chemical

## 2020-12-29 NOTE — EVENT NOTE
Date: 12/29/20


40-year-old male with history of asthma, gout, bronchitis and former smoker ( 2 

pack per week for 25 years, quit last week), pancreatitis who presented to the 

emergency department on 12/29 with complaints of shortness of breath and 

generalized weakness for 2 days, wheezing and occasional productive cough with 

thick white/yellow sputum.  CT chest shows bilateral interstitial infiltrate.  

Patient is a COVID-19 PUI and his PCR resulted as negative.  Infectious disease 

was consulted and they are evaluating for eosinophilic pneumonitis versus Ch[place]-

Ronda.  His antibiotics were discontinued by infectious disease given low 

procalcitonin and he will be continued on steroid therapy.  Patient states that 

he feels symptomatically better and remains on room air at the time of 

examination.  Vital signs stable.  We will continue to monitor

## 2020-12-29 NOTE — CONSULTATION
History of Present Illness





- Reason for Consult


Consult date: 12/29/20


COVID PUI


Requesting physician: CASSANDRA PETTY





- History of Present Illness





The patient is a 40-year-old male with history of asthma, gout, bronchitis, 

pancreatitis was recently hospitalized with cough and shortness of breath in 

November 2020, COVID-19 testing was negative, was discharged home, now 

readmitted with increasing shortness of breath, cough and sputum production.  CT

chest showed bilateral interstitial infiltrates, kind of similar in appearance 

to his previous CT scan.  There was also bilateral hilar and mediastinal 

lymphadenopathy present.  COVID-19 testing again is negative.  Infectious 

diseases was consulted for additional evaluation. No fever. 





Diagnosed with asthma within the last 1 year.  Symptoms similarly have been 

going on for the same duration.  Works in a warehouse.  Tested negative for HIV 

2 years ago.  Quit cigarette smoking.  Denies any other recreational drug use.  

Denies high risk sexual behavior.





Review of Systems: 


General: no fevers,chills or rigors


HEENT: no new visual disturbance


Respiratory: cough, wheeze, shortness of breath


Cardiovascular: No chest pain, syncope


Gastrointestinal: No nausea, vomiting or diarrhea


Genitourinary: No dysuria or hematuria


Musculoskeletal: No new or worsening neck pain or back pain 


Neurologic: No headaches, seizures


Hematologic: No easy bruising or bleeding


Endocrine: No night sweats or acute weight loss


Skin: negative for rash, jaundice


Psychiatric: No suicidal or homicidal ideation





Past History


Past Medical History: other (Asthma, gout, bronchitis, prior keratitis)


Past Surgical History: No surgical history


Social history: single, lives with family, smoking.  denies: alcohol abuse, 

prescription drug abuse, IV drug use


Family history: other (Denies family medical history)





Medications and Allergies


                                    Allergies











Allergy/AdvReac Type Severity Reaction Status Date / Time


 


No Known Allergies Allergy   Verified 07/06/20 15:17











                                Home Medications











 Medication  Instructions  Recorded  Confirmed  Last Taken  Type


 


Albuterol Mdi (or & Nicu Only) 2 puff IH QID PRN #8.5 gram 11/26/20 12/29/20 

Unknown Rx





[ProAir HFA Inhaler]     


 


Budesonide/Formoterol Fumarate 10.2 gm IH BID #1 hfa.aer.ad 11/26/20 12/29/20 

Unknown Rx





[Symbicort 160-4.5 Mcg Inhaler]     


 


lisinopriL [Zestril TAB] 1 tab PO DAILY 12/29/20 12/29/20 Unknown History











Active Meds: 


Active Medications





Acetaminophen (Acetaminophen 325 Mg Tab)  650 mg PO Q4H PRN


   PRN Reason: Pain MILD(1-3)/Fever >100.5/HA


Albuterol/Ipratropium (Ipratropium/Albuterol Sulfate 3 Ml Ampul.Neb)  1 ampul IH

Q6HRT ALEIDA


Budesonide (Budesonide 0.5 Mg/2 Ml Nebu)  0.5 mg IH Q12HRT ALEIDA


Enoxaparin Sodium (Enoxaparin 40 Mg/0.4 Ml Inj)  40 mg SUB-Q QDAY [facility]


   Last Admin: 12/29/20 10:16 Dose:  40 mg


   Documented by: 


Guaifenesin (Guaifenesin Er 600 Mg Tab)  600 mg PO BID [facility]


   Last Admin: 12/29/20 10:16 Dose:  600 mg


   Documented by: 


Cefepime HCl (Cefepime/Ns 1 Gm/100 Ml)  1 gm in 100 mls @ 200 mls/hr IV Q8HR 

[facility]; Protocol


   Last Admin: 12/29/20 13:54 Dose:  200 mls/hr


   Documented by: 


Methylprednisolone Sodium Succinate (Methylprednisolone Sod Succinate 40 Mg/1 Ml

Inj)  40 mg IV Q6HR [facility]


   Last Admin: 12/29/20 13:06 Dose:  40 mg


   Documented by: 


Nicotine (Nicotine 14 Mg/24 Hr Patch)  14 mg TD QDAY [facility]


   Last Admin: 12/29/20 10:20 Dose:  Not Given


   Documented by: 


Ondansetron HCl (Ondansetron 4 Mg/2 Ml Inj)  4 mg IV Q8H PRN


   PRN Reason: Nausea And Vomiting


Sodium Chloride (Sodium Chloride 0.9% 10 Ml Flush Syringe)  10 ml IV BID [facility]


   Last Admin: 12/29/20 10:17 Dose:  10 ml


   Documented by: 


Sodium Chloride (Sodium Chloride 0.9% 10 Ml Flush Syringe)  10 ml IV PRN PRN


   PRN Reason: LINE FLUSH











Physical Examination





- Physical Exam


Narrative exam: 





Physical Exam: 


Constitutional: Alert, cooperative. No acute distress


Head, Ears, Nose: Normocephalic, atraumatic. External ears, nose normal


Eyes: Conjunctivae/corneas clear. No icterus. No ptosis.


Neck: Supple, no meningeal signs


Oral: mask


Cardiovascular: S1, S2 normal. 


Respiratory: b/l wheeze


GI: Soft, non-tender; bowel sounds normal. No peritoneal signs


Musculoskeletal: No pedal edema, no cyanosis.


Skin: No rash or abscess


Hem/Lymphatic: No palpable cervical or supraclavicular nodes. No lymphangitis


Psych: Mood ok. Affect normal


Neurological: Awake, alert, oriented. No gross abnormality





- Constitutional


Vitals: 


                                   Vital Signs











Temp Pulse Resp BP Pulse Ox


 


 97.6 F   78   18   124/67   95 


 


 12/29/20 09:12  12/29/20 14:02  12/29/20 14:02  12/29/20 14:02  12/29/20 14:02








                           Temperature -Last 24 Hours











Temperature                    97.6 F


 


Temperature                    98.0 F

















Results





- Labs


CBC & Chem 7: 


                                 12/29/20 00:10





                                 12/29/20 00:10


Labs: 


                              Abnormal lab results











  12/29/20 12/29/20 12/29/20 Range/Units





  00:10 00:10 00:10 


 


WBC  13.8 H    (4.5-11.0)  K/mm3


 


RBC  5.29 H    (3.65-5.03)  M/mm3


 


Hgb  11.0 L    (11.8-15.2)  gm/dl


 


Hct  34.1 L    (35.5-45.6)  %


 


MCV  64 L    (84-94)  fl


 


MCH  21 L    (28-32)  pg


 


RDW  16.9 H    (13.2-15.2)  %


 


Lymph % (Auto)  12.9 L    (13.4-35.0)  %


 


Eos % (Auto)  4.7 H    (0.0-4.3)  %


 


Eos # (Auto)  0.6 H    (0.0-0.4)  K/mm3


 


Seg Neutrophils %  79.1 H    (40.0-70.0)  %


 


Seg Neutrophils #  11.0 H    (1.8-7.7)  K/mm3


 


INR     (0.87-1.13)  


 


APTT     (24.2-36.6)  Sec.


 


D-Dimer   1284.75 H   (0-234)  ng/mlDDU


 


POC ABG pO2     ()  mmHg


 


ABG Hemoglobin     (12.0-17.5)  


 


ABG Oxyhemoglobin     (94-98)  


 


ABG Glucose     (65-95)  mg/dL


 


Sodium    136 L  (137-145)  mmol/L


 


Glucose    150 H  ()  mg/dL


 


Calcium    7.3 L  (8.4-10.2)  mg/dL


 


Ferritin     (30.0-300.0)  ng/mL


 


Alkaline Phosphatase    134 H  ()  units/L


 


Lactate Dehydrogenase     ()  units/L


 


C-Reactive Protein     (0.00-1.30)  mg/dL


 


Total Protein    5.9 L  (6.3-8.2)  g/dL


 


Albumin    1.4 L  (3.9-5)  g/dL


 


Arterial Blood Glucose     (65-95)  mg/dL


 


Arterial Blood Ionized Calcium     (4.6-5.3)  mg/dL














  12/29/20 12/29/20 12/29/20 Range/Units





  01:43 02:12 02:12 


 


WBC     (4.5-11.0)  K/mm3


 


RBC     (3.65-5.03)  M/mm3


 


Hgb     (11.8-15.2)  gm/dl


 


Hct     (35.5-45.6)  %


 


MCV     (84-94)  fl


 


MCH     (28-32)  pg


 


RDW     (13.2-15.2)  %


 


Lymph % (Auto)     (13.4-35.0)  %


 


Eos % (Auto)     (0.0-4.3)  %


 


Eos # (Auto)     (0.0-0.4)  K/mm3


 


Seg Neutrophils %     (40.0-70.0)  %


 


Seg Neutrophils #     (1.8-7.7)  K/mm3


 


INR  1.16 H    (0.87-1.13)  


 


APTT  42.8 H    (24.2-36.6)  Sec.


 


D-Dimer  1216.52 H    (0-234)  ng/mlDDU


 


POC ABG pO2     ()  mmHg


 


ABG Hemoglobin     (12.0-17.5)  


 


ABG Oxyhemoglobin     (94-98)  


 


ABG Glucose     (65-95)  mg/dL


 


Sodium     (137-145)  mmol/L


 


Glucose     ()  mg/dL


 


Calcium     (8.4-10.2)  mg/dL


 


Ferritin   395.8 H   (30.0-300.0)  ng/mL


 


Alkaline Phosphatase     ()  units/L


 


Lactate Dehydrogenase    473 H  ()  units/L


 


C-Reactive Protein    8.90 H  (0.00-1.30)  mg/dL


 


Total Protein     (6.3-8.2)  g/dL


 


Albumin     (3.9-5)  g/dL


 


Arterial Blood Glucose     (65-95)  mg/dL


 


Arterial Blood Ionized Calcium     (4.6-5.3)  mg/dL














  12/29/20 12/29/20 12/29/20 Range/Units





  11:18 Unknown Unknown 


 


WBC     (4.5-11.0)  K/mm3


 


RBC     (3.65-5.03)  M/mm3


 


Hgb     (11.8-15.2)  gm/dl


 


Hct     (35.5-45.6)  %


 


MCV     (84-94)  fl


 


MCH     (28-32)  pg


 


RDW     (13.2-15.2)  %


 


Lymph % (Auto)     (13.4-35.0)  %


 


Eos % (Auto)     (0.0-4.3)  %


 


Eos # (Auto)     (0.0-0.4)  K/mm3


 


Seg Neutrophils %     (40.0-70.0)  %


 


Seg Neutrophils #     (1.8-7.7)  K/mm3


 


INR     (0.87-1.13)  


 


APTT     (24.2-36.6)  Sec.


 


D-Dimer     (0-234)  ng/mlDDU


 


POC ABG pO2  64.3 L    ()  mmHg


 


ABG Hemoglobin  10.9 L    (12.0-17.5)  


 


ABG Oxyhemoglobin  90.9 L    (94-98)  


 


ABG Glucose  309 H    (65-95)  mg/dL


 


Sodium     (137-145)  mmol/L


 


Glucose     ()  mg/dL


 


Calcium     (8.4-10.2)  mg/dL


 


Ferritin   406.3 H   (30.0-300.0)  ng/mL


 


Alkaline Phosphatase     ()  units/L


 


Lactate Dehydrogenase    238 H  ()  units/L


 


C-Reactive Protein    8.50 H  (0.00-1.30)  mg/dL


 


Total Protein     (6.3-8.2)  g/dL


 


Albumin     (3.9-5)  g/dL


 


Arterial Blood Glucose  309 H    (65-95)  mg/dL


 


Arterial Blood Ionized Calcium  4.5 L    (4.6-5.3)  mg/dL














- Imaging and Cardiology


Chest x-ray: report reviewed, image reviewed


CT scan - chest: report reviewed, image reviewed (CT chest showed bilateral 

interstitial infiltrates, kind of similar in appearance to his previous CT 

scan.)





Assessment and Plan





Cultures:


11/24/2020 sputum culture: Usual respiratory falguni


12/29/2020 blood culture: In process


12/29/2020 COVID-19 PCR: Negative





A/P:


40-year-old male with history of asthma, gout, bronchitis:





#Bilateral pneumonia: Infectious versus noninfectious, favor latter.  Similar 

presentation in November 2020.  Eosinophilia noted on peripheral blood count, 

evaluate for eosinophilic pneumonitis versus Churg-Ronda. Patient with history

of asthma, diagnosed within the last 1 year.  Procalcitonin remains low at 0.12.

Afebrile. CT chest showed bilateral interstitial infiltrates, kind of similar in

appearance to his previous CT scan but different location / ?migratory.





#Eosinophilia: Noted patient has now been started on steroids which will result 

in rapid improvement in eosinophilia.





Recs:


HIV test ordered (verbal consent obtained from patient)


Pulmonary consult


Low suspicion for bacterial infection, antibiotics discontinued





Kinjal Randhawa MD, FACP


[facility] ([facility])


O: [phone]


F: [phone]

## 2020-12-29 NOTE — CAT SCAN REPORT
CTA CHEST WITH IV CONTRAST



INDICATION: Shortness of breath, elevated d-dimer, history of asthma with two recent attacks



CONTRAST: 100 cc Omnipaque 350 IV



COMPARISON: CTA chest 10/13/2020, portable chest x-ray 12/28/2020



Three-plane MIP reconstructions were produced. All CT scans at this location are performed using CT d
ose reduction for ALARA by means of automated exposure control. 



FINDINGS: No significant chest wall lesions are seen. Bilateral mild axillary lymph node prominence i
s unchanged, right greater than left. Largest individual node is in the right axilla with short axis 
diameter of 11 mm. Bilateral hilar and mediastinal moderate adenopathy is mildly more prominent. Larg
est node is in the subcarinal area with short axis diameter of 18 mm. No pleural effusions are seen. 
No pneumothorax or pneumomediastinum are noted. No obvious endobronchial lesions are seen. No definit
e pulmonary nodules or masses are seen.



Previously there were scattered mild bilateral multilobar groundglass type interstitial infiltrates. 
Those areas of infiltrate have mostly cleared but more extensive and prominent similar groundglass ty
pe infiltrate is now seen mostly in the lung bases in the lower lobes and right middle lobe but also 
in the upper lobes, particularly on the right.



Aorta shows no aneurysmal dilatation or evidence of dissection.



Good opacification of the pulmonary arterial system was achieved. I do not see evidence of pulmonary 
thromboembolism.





IMPRESSION: 

1. No evidence of pulmonary thromboembolism

2. Areas of bilateral interstitial infiltrate which are nonspecific but certainly could include viral
 pneumonitis. Patient showed similar interstitial infiltrates in a different distribution on the prio
r examination in October. These are significantly more prominent today however.

3. Bilateral hilar and mediastinal adenopathy is mildly more prominent. This could be reactive based 
on the pulmonary infiltrates. However, there is also mild bilateral axillary adenopathy which is stab
le from study in October. Recommend clinical and CT follow-up.



Signer Name: Donis Landeros MD 

Signed: 12/29/2020 3:03 AM

Workstation Name: [id number]

## 2020-12-30 VITALS — SYSTOLIC BLOOD PRESSURE: 136 MMHG | DIASTOLIC BLOOD PRESSURE: 84 MMHG

## 2020-12-30 LAB
BASOPHILS # (AUTO): 0 K/MM3 (ref 0–0.1)
BASOPHILS NFR BLD AUTO: 0.2 % (ref 0–1.8)
BUN SERPL-MCNC: 28 MG/DL (ref 9–20)
BUN SERPL-MCNC: 28 MG/DL (ref 9–20)
BUN/CREAT SERPL: 14 %
CALCIUM SERPL-MCNC: 7.7 MG/DL (ref 8.4–10.2)
EOSINOPHIL # BLD AUTO: 0 K/MM3 (ref 0–0.4)
EOSINOPHIL NFR BLD AUTO: 0 % (ref 0–4.3)
HCT VFR BLD CALC: 31.2 % (ref 35.5–45.6)
HEMOLYSIS INDEX: 3
HGB BLD-MCNC: 9.9 GM/DL (ref 11.8–15.2)
LYMPHOCYTES # BLD AUTO: 1 K/MM3 (ref 1.2–5.4)
LYMPHOCYTES NFR BLD AUTO: 8.9 % (ref 13.4–35)
MCHC RBC AUTO-ENTMCNC: 32 % (ref 32–34)
MCV RBC AUTO: 66 FL (ref 84–94)
MONOCYTES # (AUTO): 0.3 K/MM3 (ref 0–0.8)
MONOCYTES % (AUTO): 2.2 % (ref 0–7.3)
PLATELET # BLD: 265 K/MM3 (ref 140–440)
RBC # BLD AUTO: 4.72 M/MM3 (ref 3.65–5.03)

## 2020-12-30 RX ADMIN — IPRATROPIUM BROMIDE AND ALBUTEROL SULFATE SCH: .5; 3 SOLUTION RESPIRATORY (INHALATION) at 17:31

## 2020-12-30 RX ADMIN — NICOTINE SCH: 14 PATCH TRANSDERMAL at 09:41

## 2020-12-30 RX ADMIN — ENOXAPARIN SODIUM SCH: 100 INJECTION SUBCUTANEOUS at 09:41

## 2020-12-30 RX ADMIN — Medication SCH ML: at 09:37

## 2020-12-30 RX ADMIN — IPRATROPIUM BROMIDE AND ALBUTEROL SULFATE SCH: .5; 3 SOLUTION RESPIRATORY (INHALATION) at 02:00

## 2020-12-30 RX ADMIN — NICOTINE SCH MG: 14 PATCH TRANSDERMAL at 09:37

## 2020-12-30 RX ADMIN — IPRATROPIUM BROMIDE AND ALBUTEROL SULFATE SCH: .5; 3 SOLUTION RESPIRATORY (INHALATION) at 08:02

## 2020-12-30 RX ADMIN — METHYLPREDNISOLONE SODIUM SUCCINATE SCH MG: 40 INJECTION, POWDER, FOR SOLUTION INTRAMUSCULAR; INTRAVENOUS at 00:09

## 2020-12-30 RX ADMIN — METHYLPREDNISOLONE SODIUM SUCCINATE SCH MG: 40 INJECTION, POWDER, FOR SOLUTION INTRAMUSCULAR; INTRAVENOUS at 05:38

## 2020-12-30 RX ADMIN — BUDESONIDE SCH: 0.5 INHALANT RESPIRATORY (INHALATION) at 08:02

## 2020-12-30 RX ADMIN — ENOXAPARIN SODIUM SCH MG: 100 INJECTION SUBCUTANEOUS at 09:37

## 2020-12-30 NOTE — EVENT NOTE
Date: 12/30/20





Know this patient from last hospital stay.  Did not follow up in office.  Agree 

with ID assessment especially with eosinophillia.  Can speak with GI lab, not 

sure if they are doing elective cases at this time but bronch is not 

unreasonable.  Would also consider ABPA in this patient as well but 

eosinophillic pneumonia and Churg-Tiffany are definitely in the differential.  

Diagnosis would be beneficial to help determine length of therapy.  Good news is

steroids would treat most causes of eosinophillia.  Loffeler's is not 

unreasonable as well but does not have risk factors for this.

## 2020-12-30 NOTE — DISCHARGE SUMMARY
<CASSANDRA PETTYKaye - Last Filed: 12/30/20 10:01>





Providers





- Providers


Date of Admission: 


12/29/20 02:31





Attending physician: 


SUGAR JAIME MD





                                        





12/29/20 08:14


Consult to Physician [CONS] Routine 


   Comment: 


   Consulting Provider: SEBASTIEN BAILEY


   Physician Instructions: 


   Reason For Exam: covid pui





12/29/20 08:15


Consult to Physician [CONS] Routine 


   Comment: 


   Consulting Provider: LIOR CHAUHAN


   Physician Instructions: 


   Reason For Exam: covid pui, pulm hx











Primary care physician: 


PRIMARY CARE MD








Hospitalization


Condition: Stable


Pertinent studies: 





12/28 CXR shows new mild asymmetric density seen laterally in the right base. 

Possibly this could represent mild developing pneumonitis





12/28 CTA chest shows no significant chest wall lesions are seen. Bilateral mild

 axillary lymph node prominence is unchanged, right greater than left. Largest 

individual node is in the right axilla with short axis diameter of 11 mm. 

Bilateral hilar and mediastinal moderate adenopathy is mildly more prominent. 

Largest node is in the subcarinal area with short axis diameter of 18 mm. No 

pleural effusions are seen. No pneumothorax or pneumomediastinum are noted. No 

obvious endobronchial lesions are seen. No definite pulmonary nodules or masses 

are seen. Previously there were scattered mild bilateral multilobar groundglass 

type interstitial infiltrates. Those areas of infiltrate have mostly cleared but

 more extensive and prominent similar groundglass type infiltrate is now seen 

mostly in the lung bases in the lower lobes and right middle lobe but also in 

the upper lobes, particularly on the right. Aorta shows no aneurysmal dilatation

 or evidence of dissection. Good opacification of the pulmonary arterial system 

was achieved. I do not see evidence of pulmonary thromboembolism. 


Hospital course: 





40-year-old male with asthma, gout,HTN, bronchitis and former smoker ( 2 pack 

per week for 25 years, quit last week) and pancreatitis who presented to the 

emergency department on 12/29 with complaints of shortness of breath and 

generalized weakness for 2 days, wheezing and occasional productive cough with 

thick white/yellow sputum.  CT chest shows bilateral interstitial infiltrate.  

Patient is a COVID-19 PUI and his PCR resulted as negative.  Infectious disease 

was consulted and they are evaluating for eosinophilic pneumonitis versus Churg-

Ronda.  His antibiotics were discontinued by infectious disease given low 

procalcitonin and he will be continued on steroid therapy.  Patient had a long 

discussion with Dr. Chauhan and revealed that patient was being worked up at 

[facility] and they wanted to start steroids for 6 months however the 

patient was hesitant.  Dr. Chauhan has encouraged the patient to follow-up with 

[facility] and to start steroid therapy for 6 months.  Dr. Chauhan is 

recommending a follow-up bronchoscopy in 6 months.  This morning patient has JAKE

 (Cr/BUN 2.2/28 from Cr/BUN 1.1/10 at admit) but revealed to day that he has 

"issues with his kidneys and was started on lisinopril and a water pill." We 

will bolus the patient this morning and f/u BMP.  Patient will need to follow-up

 with his primary care physician and [place] within 1 to 2 weeks of discharge.  

Patient will be discharged with a steroid taper. 





SIRS


-Leukocytosis 13.8 ( up from 13.3 on 11/25/20)


-Tachycardic with heart rate 122 bpm


-Afebrile


-12/29 blood culture x2 no growth for 48 hours





Suspected COVID-19 infection


-Complains of loss of smell x2 weeks, increased shortness of breath, generalized

 weakness


-Last tested for Covid on 11/24 with negative results


-D-dimer 1216, CT chest shows no acute pulmonary embolism


-12/29 COVID-19 PCR negative





Acute Exacerbation Asthma 


-CXR shows mild pneumonitis and right lung base


-Continue supportive care


-Continue duo nebs and Pulmicort


-Will be discharged on a steroid taper


-Continue Mucinex


-S/p antibiotics





Pneumonitis


-In right lung base seen on today's CXR


-S/p on IV ABX


-Infectious disease consulted, appreciate recommendations


-Pulmonology consulted, appreciate recommendations





Anemia 


-Hemoglobin on admission 11.0


-Stable


-Continue over-the-counter supplementation








Disposition: DC-01 TO HOME OR SELFCARE


Time spent for discharge: 35





Core Measure Documentation





- Palliative Care


Palliative Care/ Comfort Measures: Not Applicable





- Core Measures


Any of the following diagnoses?: none





Exam





- Constitutional


Vitals: 


                                        











Temp Pulse Resp BP Pulse Ox


 


 96.2 F L  67   14   127/76   94 


 


 12/30/20 08:23  12/30/20 08:23  12/30/20 04:26  12/30/20 08:23  12/30/20 08:23











General appearance: Present: no acute distress





- EENT


Eyes: Present: PERRL, EOM intact


ENT: hearing intact, clear oral mucosa, dentition normal





- Neck


Neck: Present: normal ROM





- Respiratory


Respiratory effort: normal


Respiratory: bilateral: CTA





- Cardiovascular


Rhythm: regular


Heart Sounds: Present: S1 & S2.  Absent: systolic murmur, diastolic murmur





- Extremities


Extremities: no ischemia, pulses intact, pulses symmetrical, No edema, normal 

temperature, normal color, Full ROM


Peripheral Pulses: within normal limits





- Abdominal


General gastrointestinal: Present: soft, non-tender, non-distended, normal bowel

 sounds





- Integumentary


Integumentary: Present: clear, warm, dry





- Musculoskeletal


Musculoskeletal: strength equal bilaterally





- Psychiatric


Psychiatric: appropriate mood/affect, memory intact, cooperative





- Neurologic


Neurologic: CNII-XII intact, no focal deficits, moves all extremities





- Allied Health


Allied health notes reviewed: nursing





Plan


Activity: advance as tolerated


Diet: regular


Special Instructions: smoking cessation


Additional Instructions: If you experience worsening symptoms present to nearest

 emergency department contact your primary care physician.  Follow-up with your 

primary care physician within 1 to 2 weeks of discharge.  Follow-up with [facility] 

as discussed with Dr. Chauhan.  You will be discharged with a steroid taper.  It 

is recommended that you follow-up with Dr. Chauhan for bronchoscopy within 6 

months.


Follow up with: 


PRIMARY CARE,MD [Primary Care Provider] - 7 Days


Forms:  Work/School Release Form


Prescriptions: 


amLODIPine 5 mg PO ONCE #30 tablet


predniSONE [Deltasone] 5 mg PO .TAPER #48 tab


guaiFENesin ER [Mucinex ER] 600 mg PO BID #14 tablet





<SUGAR JAIME - Last Filed: 12/31/20 16:34>





Providers





- Providers


Date of Admission: 


12/29/20 02:31





Attending physician: 


SUGAR JAIME MD





                                        





12/29/20 08:14


Consult to Physician [CONS] Routine 


   Comment: 


   Consulting Provider: SEBASTIEN BAILEY


   Physician Instructions: 


   Reason For Exam: covid pui





12/29/20 08:15


Consult to Physician [CONS] Routine 


   Comment: 


   Consulting Provider: LIOR CHAUHAN


   Physician Instructions: 


   Reason For Exam: covid pui, pulm hx











Primary care physician: 


PRIMARY CARE MD








Hospitalization


Hospital course: 


I saw and evaluated the patient. I agree with the findings and the plan of care 

as documented in the Nurse Practitioner's~note, with the following corrections 

and additions.











Exam





- Constitutional


Vitals: 


                                        











Temp Pulse Resp BP Pulse Ox


 


 96.9 F L  75   18   136/84   97 


 


 12/30/20 12:45  12/30/20 12:45  12/30/20 12:45  12/30/20 12:45  12/30/20 12:45

## 2022-04-21 NOTE — XRAY REPORT
CHEST 2 VIEWS 



INDICATION / CLINICAL INFORMATION:

cp wheezing fever.



COMPARISON: 

None available.



FINDINGS:



SUPPORT DEVICES: None.

HEART / MEDIASTINUM: No significant abnormality. 

LUNGS / PLEURA: No significant pulmonary or pleural abnormality. .No pneumothorax. 



ADDITIONAL FINDINGS: No significant additional findings.



IMPRESSION:

1. No acute findings.



Signer Name: Wing Nevarez MD 

Signed: 2/20/2020 2:15 AM

 Workstation Name: [id number]
[FreeTextEntry1] : Here for syncopal pseudo